# Patient Record
Sex: MALE | Race: WHITE | NOT HISPANIC OR LATINO | Employment: OTHER | URBAN - NONMETROPOLITAN AREA
[De-identification: names, ages, dates, MRNs, and addresses within clinical notes are randomized per-mention and may not be internally consistent; named-entity substitution may affect disease eponyms.]

---

## 2017-08-22 ENCOUNTER — OFFICE VISIT (OUTPATIENT)
Dept: ORTHOPEDIC SURGERY | Facility: CLINIC | Age: 62
End: 2017-08-22

## 2017-08-22 DIAGNOSIS — M25.461 KNEE EFFUSION, RIGHT: ICD-10-CM

## 2017-08-22 DIAGNOSIS — M25.561 PAIN IN BOTH KNEES, UNSPECIFIED CHRONICITY: Primary | ICD-10-CM

## 2017-08-22 DIAGNOSIS — Z96.651 STATUS POST TOTAL KNEE REPLACEMENT, RIGHT: ICD-10-CM

## 2017-08-22 DIAGNOSIS — M25.562 PAIN IN BOTH KNEES, UNSPECIFIED CHRONICITY: Primary | ICD-10-CM

## 2017-08-22 DIAGNOSIS — M25.462 EFFUSION, LEFT KNEE: ICD-10-CM

## 2017-08-22 DIAGNOSIS — M17.32 POST-TRAUMATIC OSTEOARTHRITIS OF LEFT KNEE: ICD-10-CM

## 2017-08-22 PROCEDURE — 20610 DRAIN/INJ JOINT/BURSA W/O US: CPT | Performed by: ORTHOPAEDIC SURGERY

## 2017-08-22 PROCEDURE — 73560 X-RAY EXAM OF KNEE 1 OR 2: CPT | Performed by: ORTHOPAEDIC SURGERY

## 2017-08-22 PROCEDURE — 99213 OFFICE O/P EST LOW 20 MIN: CPT | Performed by: ORTHOPAEDIC SURGERY

## 2017-08-22 RX ADMIN — LIDOCAINE HYDROCHLORIDE 2 ML: 10 INJECTION, SOLUTION INFILTRATION; PERINEURAL at 11:13

## 2017-08-22 RX ADMIN — METHYLPREDNISOLONE ACETATE 160 MG: 80 INJECTION, SUSPENSION INTRA-ARTICULAR; INTRALESIONAL; INTRAMUSCULAR; SOFT TISSUE at 11:13

## 2017-08-31 PROBLEM — M25.461 KNEE EFFUSION, RIGHT: Status: ACTIVE | Noted: 2017-08-31

## 2017-08-31 PROBLEM — M25.562 PAIN IN BOTH KNEES: Status: ACTIVE | Noted: 2017-08-31

## 2017-08-31 PROBLEM — M25.561 PAIN IN BOTH KNEES: Status: ACTIVE | Noted: 2017-08-31

## 2017-08-31 PROBLEM — Z96.651 STATUS POST TOTAL KNEE REPLACEMENT, RIGHT: Status: ACTIVE | Noted: 2017-08-31

## 2017-08-31 PROBLEM — M25.462 EFFUSION, LEFT KNEE: Status: ACTIVE | Noted: 2017-08-31

## 2017-08-31 RX ORDER — LIDOCAINE HYDROCHLORIDE 10 MG/ML
2 INJECTION, SOLUTION INFILTRATION; PERINEURAL
Status: COMPLETED | OUTPATIENT
Start: 2017-08-22 | End: 2017-08-22

## 2017-08-31 RX ORDER — METHYLPREDNISOLONE ACETATE 80 MG/ML
160 INJECTION, SUSPENSION INTRA-ARTICULAR; INTRALESIONAL; INTRAMUSCULAR; SOFT TISSUE
Status: COMPLETED | OUTPATIENT
Start: 2017-08-22 | End: 2017-08-22

## 2018-11-05 ENCOUNTER — OFFICE VISIT CONVERTED (OUTPATIENT)
Dept: CARDIOLOGY | Facility: CLINIC | Age: 63
End: 2018-11-05
Attending: SPECIALIST

## 2018-11-05 ENCOUNTER — CONVERSION ENCOUNTER (OUTPATIENT)
Dept: OTHER | Facility: HOSPITAL | Age: 63
End: 2018-11-05

## 2018-11-06 ENCOUNTER — OFFICE VISIT (OUTPATIENT)
Dept: ORTHOPEDIC SURGERY | Facility: CLINIC | Age: 63
End: 2018-11-06

## 2018-11-06 DIAGNOSIS — Z96.651 STATUS POST TOTAL KNEE REPLACEMENT, RIGHT: ICD-10-CM

## 2018-11-06 DIAGNOSIS — M25.561 PAIN IN BOTH KNEES, UNSPECIFIED CHRONICITY: Primary | ICD-10-CM

## 2018-11-06 DIAGNOSIS — M25.562 PAIN IN BOTH KNEES, UNSPECIFIED CHRONICITY: Primary | ICD-10-CM

## 2018-11-06 PROCEDURE — 73562 X-RAY EXAM OF KNEE 3: CPT | Performed by: ORTHOPAEDIC SURGERY

## 2018-11-06 PROCEDURE — 99213 OFFICE O/P EST LOW 20 MIN: CPT | Performed by: ORTHOPAEDIC SURGERY

## 2018-11-06 RX ORDER — CHLORAL HYDRATE 500 MG
1000 CAPSULE ORAL
COMMUNITY

## 2018-11-06 RX ORDER — RANITIDINE 150 MG/1
150 CAPSULE ORAL
COMMUNITY
End: 2022-10-04

## 2018-11-06 RX ORDER — AMLODIPINE BESYLATE AND BENAZEPRIL HYDROCHLORIDE 10; 40 MG/1; MG/1
1 CAPSULE ORAL DAILY
COMMUNITY
Start: 2018-11-02

## 2018-11-06 NOTE — PROGRESS NOTES
Chief Complaint   Patient presents with   • Right Knee - Follow-up   • Left Knee - Follow-up           HPI The patient is here today for a follow up of his right and left knee.  The patient had a right total knee arthroplasty performed by Dr. Silvestre about 10 years ago.  He states that he is always had troubles with that knee replacement.  It has never really functioned well for him.  The patient states that he wants a revision on that knee.  Patient states that knee edith and gives out from underneath him.  It is not stable for him.  He states that he really would like to get it revised.  His left knee has also started to bother him quite a bit.  He has pain and discomfort on the medial aspect of the knee.  He has progressive varus deformity of that knee.  He states that he has been having in her ear problems and is consulting with an ENT surgeon on that condition.  The patient also states that he is recently had a cardiac catheterization and would like to solve all his medical issues prior to committing for surgical intervention on knee replacement.  The patient had bilateral shoulder pain and discomfort with weakness in abduction.  Cross body activities bother him significantly.  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         There were no vitals filed for this visit.        Review of Systems   Constitutional: Negative.    HENT: Negative.    Eyes: Negative.    Respiratory: Negative.    Cardiovascular: Negative.    Gastrointestinal: Negative.    Endocrine: Negative.    Genitourinary: Negative.    Musculoskeletal: Positive for gait problem and joint swelling.   Skin: Negative.    Allergic/Immunologic: Negative.    Hematological: Negative.    Psychiatric/Behavioral: Negative.            Physical Exam   Constitutional: He appears  well-nourished.   HENT:   Head: Atraumatic.   Eyes: EOM are normal.   Neck: Neck supple.   Cardiovascular: Normal heart sounds and intact distal pulses.   Pulmonary/Chest: Breath sounds normal.   Abdominal: Bowel sounds are normal.   Musculoskeletal: He exhibits edema and tenderness.   Neurological: He is alert.   Skin: Skin is warm. Capillary refill takes 2 to 3 seconds.   Psychiatric: He has a normal mood and affect. His behavior is normal. Thought content normal.   Nursing note and vitals reviewed.          Joint/Body Part Specific Exam:  right knee.The patient is status post total knee arthroplasty postoperative 10 year(s). Incision is clean. Calf is soft and nontender. Homans sign is negative. There is no clicking, popping or catching. Anterior and posterior drawer signs are negative.  There is no instability of the components. Appropriate amounts of swelling and bruising are noted. Dorsalis pedis and posterior tibial artery pulses are palpable. Common peroneal nerve function is well preserved. Range of motion is from 0- 110 degrees of flexion. Gait is cautious but otherwise fairly normal. There is no evidence of a deep seated joint infection.    left knee. Patient has crepitus throughout range of motion. Positive patellar grind test. Mild effusion. Lachman is negative. Pivot shift is negative. Anterior and posterior drawer signs are negative. Significant joint line tenderness is noted on the medial aspect of the knee. Patient has a varus orientation of the knee. There is fullness and tenderness in the Popliteal fossa. Mild distention of a Popliteal cyst is noted in this location. Range of motion in flexion is from 0- 110 degrees. Neurovascular status is intact.  Dorsalis pedis and posterior tibial artery pulses are palpable. Common peroneal nerve function is well preserved. Patient's gait is cautious and antalgic. Skin and soft tissues are mildly swollen, consistent with synovitis and effusion. The patient has  a significant limp with the first few steps after starting the gait cycle. Getting out of a chair takes a lot of effort due to pain on knee flexion.  X-RAY Report:  bilateral Knee X-Ray  Indication: Evaluation of pain in the left knee and evaluation of implants on the right side  AP, Lateral views  Findings: advanced djd medial compartment of left knee  no bony lesion  Soft tissues within normal limits  decreased joint spaces on the left knee  Hardware appropriately positioned yes; on the right side      yes prior studies available for comparison.    X-RAY was ordered and reviewed by Cholo Mckeon MD      Diagnostics:        Quentin was seen today for follow-up and follow-up.    Diagnoses and all orders for this visit:    Pain in both knees, unspecified chronicity  -     XR Knee 3 View Bilateral    Status post total knee replacement, right            Procedures        Plan: The patient states that both his knees are bothering him quite a bit.  He is wanting me to go ahead with a revision on the right knee replacement which has always bothered him quite a bit.    He also needs a left total knee arthroplasty and I have discussed that with the patient at length.    At this point he is having some issues with his inner year function and states that he is going to work with an ENT surgeon to help improve his issues with balance.    He is also had a recent cardiac catheterization and would like to get healthier than he is at this point to 38 he would can templated surgical intervention.    Use a cane for assistance with ambulation.    Tablet ibuprofen 600 mg orally twice a day for pain swelling and discomfort.    Follow-up in my office in 3 months for further decision making.

## 2018-11-19 ENCOUNTER — OFFICE VISIT CONVERTED (OUTPATIENT)
Dept: CARDIOLOGY | Facility: CLINIC | Age: 63
End: 2018-11-19
Attending: SPECIALIST

## 2018-11-19 ENCOUNTER — CONVERSION ENCOUNTER (OUTPATIENT)
Dept: OTHER | Facility: HOSPITAL | Age: 63
End: 2018-11-19

## 2020-01-30 ENCOUNTER — OFFICE VISIT CONVERTED (OUTPATIENT)
Dept: CARDIOLOGY | Facility: CLINIC | Age: 65
End: 2020-01-30
Attending: SPECIALIST

## 2020-02-03 ENCOUNTER — CONVERSION ENCOUNTER (OUTPATIENT)
Dept: CARDIOLOGY | Facility: CLINIC | Age: 65
End: 2020-02-03
Attending: SPECIALIST

## 2020-02-20 ENCOUNTER — OFFICE VISIT CONVERTED (OUTPATIENT)
Dept: CARDIOLOGY | Facility: CLINIC | Age: 65
End: 2020-02-20
Attending: SPECIALIST

## 2020-02-20 ENCOUNTER — CONVERSION ENCOUNTER (OUTPATIENT)
Dept: OTHER | Facility: HOSPITAL | Age: 65
End: 2020-02-20

## 2020-08-26 ENCOUNTER — OFFICE VISIT CONVERTED (OUTPATIENT)
Dept: CARDIOLOGY | Facility: CLINIC | Age: 65
End: 2020-08-26
Attending: INTERNAL MEDICINE

## 2021-01-06 ENCOUNTER — HOSPITAL ENCOUNTER (OUTPATIENT)
Dept: CARDIOLOGY | Facility: HOSPITAL | Age: 66
Discharge: HOME | End: 2021-01-06
Admitting: STUDENT IN AN ORGANIZED HEALTH CARE EDUCATION/TRAINING PROGRAM

## 2021-01-06 ENCOUNTER — TRANSCRIBE ORDERS (OUTPATIENT)
Dept: CARDIOLOGY | Facility: HOSPITAL | Age: 66
End: 2021-01-06

## 2021-01-06 DIAGNOSIS — I10 ESSENTIAL HYPERTENSION, MALIGNANT: Primary | ICD-10-CM

## 2021-01-06 LAB — QT INTERVAL: 438 MS

## 2021-01-06 PROCEDURE — 93010 ELECTROCARDIOGRAM REPORT: CPT | Performed by: INTERNAL MEDICINE

## 2021-01-06 PROCEDURE — 93005 ELECTROCARDIOGRAM TRACING: CPT

## 2021-01-08 ENCOUNTER — LAB REQUISITION (OUTPATIENT)
Dept: LAB | Facility: HOSPITAL | Age: 66
End: 2021-01-08

## 2021-01-08 DIAGNOSIS — Z00.00 ENCOUNTER FOR GENERAL ADULT MEDICAL EXAMINATION WITHOUT ABNORMAL FINDINGS: ICD-10-CM

## 2021-01-09 PROCEDURE — U0004 COV-19 TEST NON-CDC HGH THRU: HCPCS | Performed by: STUDENT IN AN ORGANIZED HEALTH CARE EDUCATION/TRAINING PROGRAM

## 2021-01-11 LAB — SARS-COV-2 RNA RESP QL NAA+PROBE: NOT DETECTED

## 2021-03-02 ENCOUNTER — CONVERSION ENCOUNTER (OUTPATIENT)
Dept: CARDIOLOGY | Facility: CLINIC | Age: 66
End: 2021-03-02

## 2021-03-02 ENCOUNTER — OFFICE VISIT CONVERTED (OUTPATIENT)
Dept: CARDIOLOGY | Facility: CLINIC | Age: 66
End: 2021-03-02
Attending: INTERNAL MEDICINE

## 2021-03-11 ENCOUNTER — HOSPITAL ENCOUNTER (OUTPATIENT)
Dept: NUCLEAR MEDICINE | Facility: HOSPITAL | Age: 66
Discharge: HOME | End: 2021-03-11
Attending: INTERNAL MEDICINE

## 2021-05-13 NOTE — PROGRESS NOTES
Progress Note      Patient Name: Quentin Hart   Patient ID: [id number]   Sex: Male   YOB: 1955    Primary Care Provider: Janet BRICEÑO   Referring Provider: SOUMYA LIANG MD    Visit Date: August 26, 2020    Provider: Jhonny Vega MD   Location: [facility]   Location Address: [address] [place] [address] [facility]  [address] [place] [address]  [id number]   Location Phone: [phone]          Chief Complaint     Followup visit for frequent PVCs and shortness of breath.       History Of Present Illness  REFERRING CARE PROVIDER: Janet BRICEÑO   Quentin Hart is a 64 year old /White male with a history of frequent PVCs and minimal coronary artery disease who is here for a followup visit. The patient generally follows up with Dr. Benz, but wants to switch to the [facility] to be closer to his home. He denies having any chest pain, tightness, or pressure at this time. He does not feel any skipped beats or any palpitations. He has shortness of breath on moderate exertion, which is at his baseline. He ran out of his metoprolol one month back, but still does not feel any palpitations. No dizziness. No syncopal episodes. Since his last visit, patient underwent a sleep study and was diagnosed with obstructive sleep apnea. He is using CPAP on a regular basis at this time.   PAST MEDICAL HISTORY: 1) Diabetes mellitus; 2) Hypertension; 3) Obstructive sleep apnea.   PSYCHOSOCIAL HISTORY: Previously smoked, but quit in 1973. Denies alcohol use. Admits mood changes and depression.   CURRENT MEDICATIONS: Amlodipine-benazepril 10-40 mg q. h.s.; fish oil 1000 mg 2 daily; metformin 500 mg daily; metoprolol succinate ER 25 mg daily; famotidine 20 mg 2 daily; vitamin D3 5000 units daily; magnesium; potassium; PreserVision.       Review of Systems  · Cardiovascular  o Admits  o : palpitations (fast, fluttering, or skipping beats), shortness of breath while walking or lying  flat  o Denies  o : swelling (feet, ankles, hands), chest pain or angina pectoris   · Respiratory  o Denies  o : chronic or frequent cough, asthma or wheezing      Vitals  Date Time BP Position Site L\R Cuff Size HR RR TEMP (F) WT  HT  BMI kg/m2 BSA m2 O2 Sat HC       08/26/2020 09:55 /90 Sitting    58 - R   255lbs 0oz 6'   34.58 2.42     08/26/2020 09:55 /88 Sitting                     Physical Examination  · Respiratory  o Auscultation of Lungs  o : Clear to auscultation bilaterally. No crackles or rhonchi.  · Cardiovascular  o Heart  o : S1, S2 is normally heard. No S3. No murmur, rubs, or gallops.  · Gastrointestinal  o Abdominal Examination  o : Soft, nontender, nondistended. No free fluid. Bowel sounds heard in all four quadrants.  · Extremities  o Extremities  o : Warm and well perfused. No pitting pedal edema. Distal pulses present.          Assessment     ASSESSMENT & PLAN:    1.  Frequent PVCs.  Preserved LV function.  Currently no symptoms.  He is out of metoprolol for almost a        month.  At this time, we will restart metoprolol at half the dose of 25 mg half-tablet daily.    2.  Shortness of breath, likely multifactorial.  Cardiac workup unremarkable.  Cardiac workup, including cardiac        catheterization, unremarkable.  3.  Hypertension, reasonably well controlled.  Continue current regimen.  4.  Follow up in 6 months or earlier if needed.        MD DEMETRICE Duran:vm             Electronically Signed by: Jhonny Vega MD -Author on August 28, 2020 10:14:07 AM

## 2021-05-14 VITALS
WEIGHT: 255 LBS | DIASTOLIC BLOOD PRESSURE: 90 MMHG | HEIGHT: 72 IN | SYSTOLIC BLOOD PRESSURE: 145 MMHG | BODY MASS INDEX: 34.54 KG/M2 | HEART RATE: 58 BPM

## 2021-05-14 VITALS
HEIGHT: 72 IN | WEIGHT: 251 LBS | SYSTOLIC BLOOD PRESSURE: 152 MMHG | BODY MASS INDEX: 34 KG/M2 | DIASTOLIC BLOOD PRESSURE: 72 MMHG | HEART RATE: 62 BPM

## 2021-05-14 NOTE — PROGRESS NOTES
Progress Note      Patient Name: Quentin Hart   Patient ID: [id number]   Sex: Male   YOB: 1955    Primary Care Provider: Janet BRICEÑO   Referring Provider: SOUMYA LIANG MD    Visit Date: March 2, 2021    Provider: Jhonny Vega MD   Location: [facility]   Location Address: [address] [place], [place] [address]  [address]  [place], [place]  [id number]   Location Phone: [phone]          Chief Complaint  · Frequent PVCs, patient reports chest pain       History Of Present Illness  REFERRING CARE PROVIDER: Janet BRICEÑO   Quentin Hart is a 65-year-old male with palpitations, frequent PVCs, minimal coronary artery disease who is here for a followup visit. Today, patient reports having episodes of chest pain described as tightness and heaviness in the central part of the chest both at rest and also related to activities. Palpitations are better. No dizziness or syncopal episodes. He still has shortness of breath on moderate exertion.   PAST MEDICAL HISTORY: (1) Diabetes mellitus. (2) Hypertension. (3) Obstructive sleep apnea. (4) Frequent PVCs. Holter monitor study on 02/03/2020 showed PVC burden of 13.1%.   PSYCHOSOCIAL HISTORY: Denies alcohol use. Previously smoked but quit.   CURRENT MEDICATIONS: Medication list was reviewed and is as documented.      ALLERGIES: Morphine.       Review of Systems  · Cardiovascular  o Admits  o : shortness of breath while walking or lying flat, chest pain or angina pectoris   o Denies  o : palpitations (fast, fluttering, or skipping beats), swelling (feet, ankles, hands)  · Respiratory  o Denies  o : chronic or frequent cough      Vitals  Date Time BP Position Site L\R Cuff Size HR RR TEMP (F) WT  HT  BMI kg/m2 BSA m2 O2 Sat FR L/min FiO2 HC       03/02/2021 03:09 /72 Sitting    62 - R   250lbs 16oz 6'   34.04 2.4       03/02/2021 03:09 /68 Sitting    62 - R                   Physical Examination  · Respiratory  o Auscultation of  Lungs  o : Clear to auscultation bilaterally. No crackles or rhonchi.  · Cardiovascular  o Heart  o : S1, S2 is normally heard. No S3. No murmur, rubs, or gallops.  · Gastrointestinal  o Abdominal Examination  o : Soft, nontender, nondistended. No free fluid. Bowel sounds heard in all four quadrants.  · Extremities  o Extremities  o : Warm and well perfused. No pitting pedal edema. Distal pulses present.  · Labs  o Labs  o : From 01/20/2021, total cholesterol 201, HDL 42, triglyceride 135, , BUN 14, creatinine 0.97, sodium 141, potassium 4.6, AST 17, AST 31, hemoglobin 15.0, hemoglobin A1c 6.9, TSH 1.75.          Assessment     ASSESSMENT AND PLAN:  1.  Chest pain:  Symptoms are new onset and concerning for angina. Risk factors of coronary artery disease        include diabetes and hypertension. Previous echocardiogram showed  preserved LV function in 2020. No        recent ischemic evaluation available. Will proceed with a SPECT stress test to rule out reversible ischemia.        Recommend to continue aspirin and beta-blockers.  2.  Frequent PVCs. Currently no major palpitations.  Continue Metoprolol at the current dose.   3.  Hypertension, well controlled.  4.  Followup with SPECT report.       Jhonny Vega MD  JV/pap             Electronically Signed by: Ruth Rausch-, Other -Author on March 9, 2021 02:52:42 PM  Electronically Co-signed by: Jhonny Vega MD -Reviewer on March 9, 2021 03:56:50 PM

## 2021-05-15 VITALS
DIASTOLIC BLOOD PRESSURE: 78 MMHG | HEART RATE: 71 BPM | SYSTOLIC BLOOD PRESSURE: 128 MMHG | WEIGHT: 251.12 LBS | HEIGHT: 72 IN | BODY MASS INDEX: 34.01 KG/M2

## 2021-05-15 VITALS
HEIGHT: 72 IN | DIASTOLIC BLOOD PRESSURE: 67 MMHG | WEIGHT: 250.12 LBS | SYSTOLIC BLOOD PRESSURE: 103 MMHG | HEART RATE: 70 BPM | BODY MASS INDEX: 33.88 KG/M2

## 2021-05-16 VITALS
BODY MASS INDEX: 32.64 KG/M2 | DIASTOLIC BLOOD PRESSURE: 83 MMHG | HEART RATE: 62 BPM | SYSTOLIC BLOOD PRESSURE: 124 MMHG | HEIGHT: 72 IN | WEIGHT: 241 LBS

## 2021-05-16 VITALS
HEIGHT: 72 IN | SYSTOLIC BLOOD PRESSURE: 134 MMHG | DIASTOLIC BLOOD PRESSURE: 81 MMHG | HEART RATE: 73 BPM | BODY MASS INDEX: 33.59 KG/M2 | WEIGHT: 248 LBS

## 2021-08-16 ENCOUNTER — APPOINTMENT (OUTPATIENT)
Dept: GENERAL RADIOLOGY | Facility: HOSPITAL | Age: 66
End: 2021-08-16

## 2021-08-16 ENCOUNTER — HOSPITAL ENCOUNTER (EMERGENCY)
Facility: HOSPITAL | Age: 66
Discharge: HOME | End: 2021-08-16
Attending: EMERGENCY MEDICINE | Admitting: EMERGENCY MEDICINE

## 2021-08-16 VITALS
HEIGHT: 72 IN | HEART RATE: 74 BPM | RESPIRATION RATE: 22 BRPM | DIASTOLIC BLOOD PRESSURE: 77 MMHG | WEIGHT: 241.62 LBS | OXYGEN SATURATION: 96 % | TEMPERATURE: 99.2 F | SYSTOLIC BLOOD PRESSURE: 142 MMHG | BODY MASS INDEX: 32.73 KG/M2

## 2021-08-16 DIAGNOSIS — U07.1 COVID-19: Primary | ICD-10-CM

## 2021-08-16 LAB
ALBUMIN SERPL-MCNC: 4.1 G/DL (ref 3.5–5.2)
ALBUMIN/GLOB SERPL: 1.2 G/DL
ALP SERPL-CCNC: 73 U/L (ref 39–117)
ALT SERPL W P-5'-P-CCNC: 49 U/L (ref 1–41)
ANION GAP SERPL CALCULATED.3IONS-SCNC: 10.9 MMOL/L (ref 5–15)
ARTERIAL PATENCY WRIST A: POSITIVE
AST SERPL-CCNC: 21 U/L (ref 1–40)
BASE EXCESS BLDA CALC-SCNC: 3 MMOL/L (ref -2–2)
BASOPHILS # BLD AUTO: 0.03 10*3/MM3 (ref 0–0.2)
BASOPHILS NFR BLD AUTO: 0.4 % (ref 0–1.5)
BDY SITE: ABNORMAL
BILIRUB SERPL-MCNC: 0.5 MG/DL (ref 0–1.2)
BUN SERPL-MCNC: 19 MG/DL (ref 8–23)
BUN/CREAT SERPL: 19.2 (ref 7–25)
CALCIUM SPEC-SCNC: 9.3 MG/DL (ref 8.6–10.5)
CHLORIDE SERPL-SCNC: 94 MMOL/L (ref 98–107)
CK MB SERPL-CCNC: 1.15 NG/ML
CK SERPL-CCNC: 37 U/L (ref 20–200)
CO2 SERPL-SCNC: 27.1 MMOL/L (ref 22–29)
COHGB MFR BLD: 0.5 % (ref 0–1.5)
CREAT SERPL-MCNC: 0.99 MG/DL (ref 0.76–1.27)
D-LACTATE SERPL-SCNC: 1.8 MMOL/L (ref 0.5–2)
DEPRECATED RDW RBC AUTO: 37.2 FL (ref 37–54)
EOSINOPHIL # BLD AUTO: 0 10*3/MM3 (ref 0–0.4)
EOSINOPHIL NFR BLD AUTO: 0 % (ref 0.3–6.2)
ERYTHROCYTE [DISTWIDTH] IN BLOOD BY AUTOMATED COUNT: 12.7 % (ref 12.3–15.4)
FHHB: 5.9 % (ref 0–5)
GAS FLOW AIRWAY: 0 LPM
GFR SERPL CREATININE-BSD FRML MDRD: 75 ML/MIN/1.73
GLOBULIN UR ELPH-MCNC: 3.4 GM/DL
GLUCOSE SERPL-MCNC: 265 MG/DL (ref 65–99)
HCO3 BLDA-SCNC: 27.2 MMOL/L (ref 22–26)
HCT VFR BLD AUTO: 44.4 % (ref 37.5–51)
HGB BLD-MCNC: 15.4 G/DL (ref 13–17.7)
HGB BLDA-MCNC: 15.4 G/DL (ref 13.8–16.4)
HOLD SPECIMEN: NORMAL
IMM GRANULOCYTES # BLD AUTO: 0.11 10*3/MM3 (ref 0–0.05)
IMM GRANULOCYTES NFR BLD AUTO: 1.6 % (ref 0–0.5)
INHALED O2 CONCENTRATION: 21 %
LIPASE SERPL-CCNC: 25 U/L (ref 13–60)
LYMPHOCYTES # BLD AUTO: 1 10*3/MM3 (ref 0.7–3.1)
LYMPHOCYTES NFR BLD AUTO: 14.6 % (ref 19.6–45.3)
MAGNESIUM SERPL-MCNC: 1.9 MG/DL (ref 1.6–2.4)
MCH RBC QN AUTO: 28.1 PG (ref 26.6–33)
MCHC RBC AUTO-ENTMCNC: 34.7 G/DL (ref 31.5–35.7)
MCV RBC AUTO: 81 FL (ref 79–97)
METHGB BLD QL: 0.2 % (ref 0–1.5)
MODALITY: ABNORMAL
MONOCYTES # BLD AUTO: 0.75 10*3/MM3 (ref 0.1–0.9)
MONOCYTES NFR BLD AUTO: 10.9 % (ref 5–12)
NEUTROPHILS NFR BLD AUTO: 4.96 10*3/MM3 (ref 1.7–7)
NEUTROPHILS NFR BLD AUTO: 72.5 % (ref 42.7–76)
NRBC BLD AUTO-RTO: 0 /100 WBC (ref 0–0.2)
NT-PROBNP SERPL-MCNC: 90.2 PG/ML (ref 0–900)
OXYHGB MFR BLDV: 93.4 % (ref 94–99)
PCO2 BLDA: 40.3 MM HG (ref 35–45)
PH BLDA: 7.45 PH UNITS (ref 7.35–7.45)
PLATELET # BLD AUTO: 180 10*3/MM3 (ref 140–450)
PMV BLD AUTO: 10.3 FL (ref 6–12)
PO2 BLD: 340 MM[HG] (ref 0–500)
PO2 BLDA: 71.4 MM HG (ref 80–100)
POTASSIUM SERPL-SCNC: 4.2 MMOL/L (ref 3.5–5.2)
PROT SERPL-MCNC: 7.5 G/DL (ref 6–8.5)
RBC # BLD AUTO: 5.48 10*6/MM3 (ref 4.14–5.8)
SAO2 % BLDCOA: 94.1 % (ref 95–99)
SARS-COV-2 N GENE RESP QL NAA+PROBE: DETECTED
SODIUM SERPL-SCNC: 132 MMOL/L (ref 136–145)
TROPONIN I SERPL-MCNC: 0 NG/ML (ref 0–0.6)
TROPONIN I SERPL-MCNC: 0.01 NG/ML (ref 0–0.6)
WBC # BLD AUTO: 6.85 10*3/MM3 (ref 3.4–10.8)
WHOLE BLOOD HOLD SPECIMEN: NORMAL

## 2021-08-16 PROCEDURE — 84484 ASSAY OF TROPONIN QUANT: CPT

## 2021-08-16 PROCEDURE — 36415 COLL VENOUS BLD VENIPUNCTURE: CPT

## 2021-08-16 PROCEDURE — 83605 ASSAY OF LACTIC ACID: CPT | Performed by: EMERGENCY MEDICINE

## 2021-08-16 PROCEDURE — 82805 BLOOD GASES W/O2 SATURATION: CPT | Performed by: EMERGENCY MEDICINE

## 2021-08-16 PROCEDURE — 82550 ASSAY OF CK (CPK): CPT | Performed by: EMERGENCY MEDICINE

## 2021-08-16 PROCEDURE — 93005 ELECTROCARDIOGRAM TRACING: CPT | Performed by: EMERGENCY MEDICINE

## 2021-08-16 PROCEDURE — 83050 HGB METHEMOGLOBIN QUAN: CPT | Performed by: EMERGENCY MEDICINE

## 2021-08-16 PROCEDURE — 83880 ASSAY OF NATRIURETIC PEPTIDE: CPT | Performed by: EMERGENCY MEDICINE

## 2021-08-16 PROCEDURE — 36600 WITHDRAWAL OF ARTERIAL BLOOD: CPT | Performed by: EMERGENCY MEDICINE

## 2021-08-16 PROCEDURE — 99284 EMERGENCY DEPT VISIT MOD MDM: CPT

## 2021-08-16 PROCEDURE — 87040 BLOOD CULTURE FOR BACTERIA: CPT | Performed by: EMERGENCY MEDICINE

## 2021-08-16 PROCEDURE — 82553 CREATINE MB FRACTION: CPT | Performed by: EMERGENCY MEDICINE

## 2021-08-16 PROCEDURE — 83690 ASSAY OF LIPASE: CPT | Performed by: EMERGENCY MEDICINE

## 2021-08-16 PROCEDURE — 71045 X-RAY EXAM CHEST 1 VIEW: CPT | Performed by: RADIOLOGY

## 2021-08-16 PROCEDURE — 93010 ELECTROCARDIOGRAM REPORT: CPT | Performed by: INTERNAL MEDICINE

## 2021-08-16 PROCEDURE — 83735 ASSAY OF MAGNESIUM: CPT | Performed by: EMERGENCY MEDICINE

## 2021-08-16 PROCEDURE — 80053 COMPREHEN METABOLIC PANEL: CPT | Performed by: EMERGENCY MEDICINE

## 2021-08-16 PROCEDURE — 93005 ELECTROCARDIOGRAM TRACING: CPT

## 2021-08-16 PROCEDURE — 82375 ASSAY CARBOXYHB QUANT: CPT | Performed by: EMERGENCY MEDICINE

## 2021-08-16 PROCEDURE — 87635 SARS-COV-2 COVID-19 AMP PRB: CPT | Performed by: EMERGENCY MEDICINE

## 2021-08-16 PROCEDURE — 85025 COMPLETE CBC W/AUTO DIFF WBC: CPT

## 2021-08-16 PROCEDURE — 71045 X-RAY EXAM CHEST 1 VIEW: CPT

## 2021-08-16 RX ORDER — SODIUM CHLORIDE 0.9 % (FLUSH) 0.9 %
10 SYRINGE (ML) INJECTION AS NEEDED
Status: DISCONTINUED | OUTPATIENT
Start: 2021-08-16 | End: 2021-08-16 | Stop reason: HOSPADM

## 2021-08-16 RX ORDER — FAMOTIDINE 10 MG
10 TABLET ORAL 2 TIMES DAILY
COMMUNITY
End: 2022-10-04

## 2021-08-16 RX ORDER — ASPIRIN 81 MG/1
324 TABLET, CHEWABLE ORAL ONCE
Status: COMPLETED | OUTPATIENT
Start: 2021-08-16 | End: 2021-08-16

## 2021-08-16 RX ORDER — ACETAMINOPHEN 500 MG
1000 TABLET ORAL ONCE
Status: COMPLETED | OUTPATIENT
Start: 2021-08-16 | End: 2021-08-16

## 2021-08-16 RX ORDER — ACETAMINOPHEN 160 MG/5ML
1000 SOLUTION ORAL ONCE
Status: DISCONTINUED | OUTPATIENT
Start: 2021-08-16 | End: 2021-08-16

## 2021-08-16 RX ORDER — ROSUVASTATIN CALCIUM 5 MG/1
5 TABLET, COATED ORAL DAILY
COMMUNITY
End: 2022-10-04

## 2021-08-16 RX ADMIN — ACETAMINOPHEN 1000 MG: 500 TABLET ORAL at 11:47

## 2021-08-16 RX ADMIN — ASPIRIN 324 MG: 81 TABLET, CHEWABLE ORAL at 11:52

## 2021-08-16 NOTE — ED PROVIDER NOTES
Time: 11:02 AM EDT  Arrived by: private vehicle; accompanied by spouse   Chief Complaint: COUGH and SOB   History provided by: pt  History is limited by: N/A    History of Present Illness:  Patient is a 65 y.o. male that presents to the emergency department with COUGH and SOB. Symptoms started 8 days ago and are still present and constant. It is gradual in onset. Symptoms are described as moderate in severity. Nothing improves or worsens symptoms.     Pt also c/o CP due to coughing. He voices no other complaints at this time. No fever.     Pt has had his first COVID-19 vaccine on 7/29/21.     History provided by:  Patient    Similar Symptoms Previously: no  Recently seen: not recently seen in this ED     Patient Care Team  Primary Care Provider: Janet Cook APRN     Past Medical History:     Allergies   Allergen Reactions   • Morphine Nausea And Vomiting     Past Medical History:   Diagnosis Date   • Diabetes mellitus (CMS/HCC)    • Hypertension      History reviewed. No pertinent surgical history.  History reviewed. No pertinent family history.    Home Medications:  Prior to Admission medications    Medication Sig Start Date End Date Taking? Authorizing Provider   famotidine (PEPCID) 10 MG tablet Take 10 mg by mouth 2 (Two) Times a Day.   Yes Gloria Pierce MD   metFORMIN (GLUCOPHAGE) 500 MG tablet Take 500 mg by mouth 2 (Two) Times a Day With Meals.   Yes Gloria Pierce MD   rosuvastatin (CRESTOR) 5 MG tablet Take 5 mg by mouth Daily.   Yes Gloria Pierce MD   amLODIPine-benazepril (LOTREL) 10-40 MG per capsule  11/2/18   Gloria Pierce MD   Cholecalciferol (VITAMIN D) 1000 units tablet Take 5,000 Units by mouth.    Gloria Pierce MD   Magnesium Carbonate 54 MG/5ML liquid Take 400 mg by mouth.    Gloria Pierce MD   Omega-3 Fatty Acids (FISH OIL) 1000 MG capsule capsule Take 1,200 each by mouth.    Gloria Pierce MD   ranitidine (ZANTAC) 150 MG capsule Take 150  "mg by mouth.    Provider, Historical, MD        Social History:   PT  reports that he has quit smoking. He does not have any smokeless tobacco history on file. He reports previous alcohol use. He reports that he does not use drugs.    Record Review:  I have reviewed the patient's records in [facility].     Review of Systems  Review of Systems   Constitutional: Negative for chills, diaphoresis and fever.   HENT: Negative for ear discharge and nosebleeds.    Eyes: Negative for photophobia.   Respiratory: Positive for cough and shortness of breath.    Cardiovascular: Positive for chest pain (secondary to cough).   Gastrointestinal: Negative for diarrhea, nausea and vomiting.   Genitourinary: Negative for dysuria.   Musculoskeletal: Negative for back pain and neck pain.   Skin: Negative for rash.   Neurological: Negative for headaches.   All other systems reviewed and are negative.       Physical Exam  /77   Pulse 74   Temp 99.2 °F (37.3 °C)   Resp 22   Ht 182.9 cm (72\")   Wt 110 kg (241 lb 10 oz)   SpO2 96%   BMI 32.77 kg/m²     Physical Exam  Vitals and nursing note reviewed.   Constitutional:       General: He is not in acute distress.     Appearance: Normal appearance.   HENT:      Head: Normocephalic and atraumatic.      Nose: Nose normal.   Eyes:      General: No scleral icterus.  Cardiovascular:      Rate and Rhythm: Normal rate and regular rhythm.      Heart sounds: Normal heart sounds.   Pulmonary:      Effort: Pulmonary effort is normal. No respiratory distress.      Breath sounds: Examination of the right-lower field reveals rhonchi. Examination of the left-lower field reveals rhonchi. Rhonchi present.      Comments: Actively coughing.   Abdominal:      Palpations: Abdomen is soft.      Tenderness: There is no abdominal tenderness.   Musculoskeletal:         General: Normal range of motion.      Cervical back: Neck supple.      Right lower leg: No edema.      Left lower leg: No edema.   Skin:     " "General: Skin is warm and dry.   Neurological:      General: No focal deficit present.      Mental Status: He is alert and oriented to person, place, and time.      Sensory: No sensory deficit.      Motor: No weakness.                  ED Course  /77   Pulse 74   Temp 99.2 °F (37.3 °C)   Resp 22   Ht 182.9 cm (72\")   Wt 110 kg (241 lb 10 oz)   SpO2 96%   BMI 32.77 kg/m²   Results for orders placed or performed during the hospital encounter of 08/16/21   COVID-19, [facility] IN-HOUSE, NP SWAB IN TRANSPORT MEDIA 8-10 HR TAT - Swab, Nasopharynx    Specimen: Nasopharynx; Swab   Result Value Ref Range    COVID19 Detected (C) Not Detected - Ref. Range   Comprehensive Metabolic Panel    Specimen: Blood   Result Value Ref Range    Glucose 265 (H) 65 - 99 mg/dL    BUN 19 8 - 23 mg/dL    Creatinine 0.99 0.76 - 1.27 mg/dL    Sodium 132 (L) 136 - 145 mmol/L    Potassium 4.2 3.5 - 5.2 mmol/L    Chloride 94 (L) 98 - 107 mmol/L    CO2 27.1 22.0 - 29.0 mmol/L    Calcium 9.3 8.6 - 10.5 mg/dL    Total Protein 7.5 6.0 - 8.5 g/dL    Albumin 4.10 3.50 - 5.20 g/dL    ALT (SGPT) 49 (H) 1 - 41 U/L    AST (SGOT) 21 1 - 40 U/L    Alkaline Phosphatase 73 39 - 117 U/L    Total Bilirubin 0.5 0.0 - 1.2 mg/dL    eGFR Non African Amer 75 >60 mL/min/1.73    Globulin 3.4 gm/dL    A/G Ratio 1.2 g/dL    BUN/Creatinine Ratio 19.2 7.0 - 25.0    Anion Gap 10.9 5.0 - 15.0 mmol/L   Lipase    Specimen: Blood   Result Value Ref Range    Lipase 25 13 - 60 U/L   BNP    Specimen: Blood   Result Value Ref Range    proBNP 90.2 0.0 - 900.0 pg/mL   Magnesium    Specimen: Blood   Result Value Ref Range    Magnesium 1.9 1.6 - 2.4 mg/dL   CK Total & CKMB    Specimen: Blood   Result Value Ref Range    CKMB 1.15 <=10.40 ng/mL    Creatine Kinase 37 20 - 200 U/L   CBC Auto Differential    Specimen: Blood   Result Value Ref Range    WBC 6.85 3.40 - 10.80 10*3/mm3    RBC 5.48 4.14 - 5.80 10*6/mm3    Hemoglobin 15.4 13.0 - 17.7 g/dL    Hematocrit 44.4 37.5 - " 51.0 %    MCV 81.0 79.0 - 97.0 fL    MCH 28.1 26.6 - 33.0 pg    MCHC 34.7 31.5 - 35.7 g/dL    RDW 12.7 12.3 - 15.4 %    RDW-SD 37.2 37.0 - 54.0 fl    MPV 10.3 6.0 - 12.0 fL    Platelets 180 140 - 450 10*3/mm3    Neutrophil % 72.5 42.7 - 76.0 %    Lymphocyte % 14.6 (L) 19.6 - 45.3 %    Monocyte % 10.9 5.0 - 12.0 %    Eosinophil % 0.0 (L) 0.3 - 6.2 %    Basophil % 0.4 0.0 - 1.5 %    Immature Grans % 1.6 (H) 0.0 - 0.5 %    Neutrophils, Absolute 4.96 1.70 - 7.00 10*3/mm3    Lymphocytes, Absolute 1.00 0.70 - 3.10 10*3/mm3    Monocytes, Absolute 0.75 0.10 - 0.90 10*3/mm3    Eosinophils, Absolute 0.00 0.00 - 0.40 10*3/mm3    Basophils, Absolute 0.03 0.00 - 0.20 10*3/mm3    Immature Grans, Absolute 0.11 (H) 0.00 - 0.05 10*3/mm3    nRBC 0.0 0.0 - 0.2 /100 WBC   Lactic Acid, Plasma    Specimen: Blood   Result Value Ref Range    Lactate 1.8 0.5 - 2.0 mmol/L   Blood Gas, Arterial -With Co-Ox Panel: Yes    Specimen: Arterial Blood   Result Value Ref Range    pH, Arterial 7.447 7.350 - 7.450 pH units    pCO2, Arterial 40.3 35.0 - 45.0 mm Hg    pO2, Arterial 71.4 (L) 80.0 - 100.0 mm Hg    HCO3, Arterial 27.2 (H) 22.0 - 26.0 mmol/L    Base Excess, Arterial 3.0 (H) -2.0 - 2.0 mmol/L    O2 Saturation, Arterial 94.1 (L) 95.0 - 99.0 %    Hemoglobin, Blood Gas 15.4 13.8 - 16.4 g/dL    Carboxyhemoglobin 0.5 0 - 1.5 %    Methemoglobin 0.20 0.00 - 1.50 %    Oxyhemoglobin 93.4 (L) 94 - 99 %    FHHB 5.9 (H) 0.0 - 5.0 %    Site Arterial: left radial     Modality Room Air     FIO2 21 %    Flow Rate 0 lpm    PO2/FIO2 340 0 - 500    Myron's Test Positive    POC Troponin I    Specimen: Blood   Result Value Ref Range    Troponin I 0.01 0.00 - 0.60 ng/mL   POC Troponin I    Specimen: Blood   Result Value Ref Range    Troponin I 0.00 0.00 - 0.60 ng/mL   ECG 12 Lead   Result Value Ref Range    QT Interval 365 ms   ECG 12 Lead   Result Value Ref Range    QT Interval 375 ms   Green Top (Gel)   Result Value Ref Range    Extra Tube Hold for add-ons.     Lavender Top   Result Value Ref Range    Extra Tube hold for add-on    Gold Top - SST   Result Value Ref Range    Extra Tube Hold for add-ons.    HOLD Troponin-I Tube   Result Value Ref Range    Extra Tube Hold for add-ons.    HOLD Troponin-I Tube   Result Value Ref Range    Extra Tube Hold for add-ons.      Medications   aspirin chewable tablet 324 mg (324 mg Oral Given 8/16/21 1152)   acetaminophen (TYLENOL) tablet 1,000 mg (1,000 mg Oral Given 8/16/21 1147)     XR Chest 1 View    Result Date: 8/16/2021  Narrative: PROCEDURE: XR CHEST 1 VW  COMPARISON: None  INDICATIONS: Chest Pain triage protocol  FINDINGS:  The lungs are clear bilaterally.  The cardiac and mediastinal silhouettes appear normal.  No effusion is seen.  CONCLUSION:  1. No acute cardiopulmonary disease.       Jose Estrada M.D.       Electronically Signed and Approved By: Jose Estrada M.D. on 8/16/2021 at 12:08               Procedures/EKGs:  Procedures    Medical Decision Making:                     MDM  Number of Diagnoses or Management Options  COVID-19  Diagnosis management comments: Patient presents complaining of cough with associated chest discomfort.  Differential concerns include pneumonia versus bronchitis versus Covid.  Covid test is positive which resulted after the patient was discharged in the ED.  Blood gas showed a PO2 of 71 and the patient is appropriate for outpatient therapy chest radiograph did not show acute infiltrate.  Serum lactate is normal making sepsis unlikely.  Chemistries show glucose 265 sodium 132.  BNP is 90 making CHF unlikely.  Patient remained stable during his ED stay was discharged stable with supportive care recommended and patient is to return for dyspnea other concerns.  EKG was unremarkable did not show findings to suggest ACS as a source of her symptoms.       Amount and/or Complexity of Data Reviewed  Clinical lab tests: reviewed  Tests in the radiology section of CPT®: reviewed  Tests in the medicine  section of CPT®: reviewed    Risk of Complications, Morbidity, and/or Mortality  Presenting problems: high         Final diagnoses:   COVID-19        Disposition:  ED Disposition     ED Disposition Condition Comment    Discharge Stable           Documentation assistance provided by Earline Payton acting as scribe for Navin Thibodeaux MD. Information recorded by the scribe was done at my direction and has been verified and validated by me.       Earline Payton  08/16/21 1108       Navin Thibodeaux MD  08/16/21 2023       Navin Thibodeaux MD  08/16/21 2051

## 2021-08-16 NOTE — ED NOTES
PT STATES THAT HE HAS HAD A COUGH SINCE LAST WEEK, STATES HE HAS CHEST PAIN FROM COUGHING SO MUCH. PT STATES THAT HE IS VACCINATED.     Anne Alanis RN  08/16/21 [id number]

## 2021-08-17 ENCOUNTER — TELEPHONE (OUTPATIENT)
Dept: EMERGENCY DEPT | Facility: HOSPITAL | Age: 66
End: 2021-08-17

## 2021-08-18 ENCOUNTER — APPOINTMENT (OUTPATIENT)
Dept: GENERAL RADIOLOGY | Facility: HOSPITAL | Age: 66
End: 2021-08-18

## 2021-08-18 ENCOUNTER — HOSPITAL ENCOUNTER (EMERGENCY)
Facility: HOSPITAL | Age: 66
Discharge: HOME | End: 2021-08-18
Attending: EMERGENCY MEDICINE | Admitting: EMERGENCY MEDICINE

## 2021-08-18 VITALS
TEMPERATURE: 99.7 F | HEIGHT: 72 IN | BODY MASS INDEX: 32.23 KG/M2 | OXYGEN SATURATION: 91 % | WEIGHT: 238 LBS | HEART RATE: 77 BPM | RESPIRATION RATE: 24 BRPM | DIASTOLIC BLOOD PRESSURE: 76 MMHG | SYSTOLIC BLOOD PRESSURE: 115 MMHG

## 2021-08-18 DIAGNOSIS — U07.1 COVID-19: Primary | ICD-10-CM

## 2021-08-18 LAB
ALBUMIN SERPL-MCNC: 3.9 G/DL (ref 3.5–5.2)
ALBUMIN/GLOB SERPL: 1.2 G/DL
ALP SERPL-CCNC: 66 U/L (ref 39–117)
ALT SERPL W P-5'-P-CCNC: 43 U/L (ref 1–41)
ANION GAP SERPL CALCULATED.3IONS-SCNC: 10.3 MMOL/L (ref 5–15)
AST SERPL-CCNC: 22 U/L (ref 1–40)
BASOPHILS # BLD AUTO: 0.02 10*3/MM3 (ref 0–0.2)
BASOPHILS NFR BLD AUTO: 0.3 % (ref 0–1.5)
BILIRUB SERPL-MCNC: 0.5 MG/DL (ref 0–1.2)
BUN SERPL-MCNC: 19 MG/DL (ref 8–23)
BUN/CREAT SERPL: 19.8 (ref 7–25)
CALCIUM SPEC-SCNC: 9 MG/DL (ref 8.6–10.5)
CHLORIDE SERPL-SCNC: 93 MMOL/L (ref 98–107)
CO2 SERPL-SCNC: 26.7 MMOL/L (ref 22–29)
CREAT SERPL-MCNC: 0.96 MG/DL (ref 0.76–1.27)
D-LACTATE SERPL-SCNC: 1.7 MMOL/L (ref 0.5–2)
DEPRECATED RDW RBC AUTO: 38 FL (ref 37–54)
EOSINOPHIL # BLD AUTO: 0.01 10*3/MM3 (ref 0–0.4)
EOSINOPHIL NFR BLD AUTO: 0.2 % (ref 0.3–6.2)
ERYTHROCYTE [DISTWIDTH] IN BLOOD BY AUTOMATED COUNT: 13.1 % (ref 12.3–15.4)
GFR SERPL CREATININE-BSD FRML MDRD: 78 ML/MIN/1.73
GLOBULIN UR ELPH-MCNC: 3.3 GM/DL
GLUCOSE SERPL-MCNC: 273 MG/DL (ref 65–99)
HCT VFR BLD AUTO: 42.4 % (ref 37.5–51)
HGB BLD-MCNC: 14.7 G/DL (ref 13–17.7)
HOLD SPECIMEN: NORMAL
HOLD SPECIMEN: NORMAL
IMM GRANULOCYTES # BLD AUTO: 0.09 10*3/MM3 (ref 0–0.05)
IMM GRANULOCYTES NFR BLD AUTO: 1.4 % (ref 0–0.5)
LYMPHOCYTES # BLD AUTO: 0.92 10*3/MM3 (ref 0.7–3.1)
LYMPHOCYTES NFR BLD AUTO: 14 % (ref 19.6–45.3)
MCH RBC QN AUTO: 28.1 PG (ref 26.6–33)
MCHC RBC AUTO-ENTMCNC: 34.7 G/DL (ref 31.5–35.7)
MCV RBC AUTO: 81.1 FL (ref 79–97)
MONOCYTES # BLD AUTO: 0.52 10*3/MM3 (ref 0.1–0.9)
MONOCYTES NFR BLD AUTO: 7.9 % (ref 5–12)
NEUTROPHILS NFR BLD AUTO: 4.99 10*3/MM3 (ref 1.7–7)
NEUTROPHILS NFR BLD AUTO: 76.2 % (ref 42.7–76)
NRBC BLD AUTO-RTO: 0 /100 WBC (ref 0–0.2)
NT-PROBNP SERPL-MCNC: 75.3 PG/ML (ref 0–900)
PLATELET # BLD AUTO: 185 10*3/MM3 (ref 140–450)
PMV BLD AUTO: 9.9 FL (ref 6–12)
POTASSIUM SERPL-SCNC: 4.5 MMOL/L (ref 3.5–5.2)
PROT SERPL-MCNC: 7.2 G/DL (ref 6–8.5)
QT INTERVAL: 341 MS
RBC # BLD AUTO: 5.23 10*6/MM3 (ref 4.14–5.8)
SODIUM SERPL-SCNC: 130 MMOL/L (ref 136–145)
TROPONIN T SERPL-MCNC: <0.01 NG/ML (ref 0–0.03)
WBC # BLD AUTO: 6.55 10*3/MM3 (ref 3.4–10.8)
WHOLE BLOOD HOLD SPECIMEN: NORMAL

## 2021-08-18 PROCEDURE — 85025 COMPLETE CBC W/AUTO DIFF WBC: CPT | Performed by: EMERGENCY MEDICINE

## 2021-08-18 PROCEDURE — 80053 COMPREHEN METABOLIC PANEL: CPT | Performed by: EMERGENCY MEDICINE

## 2021-08-18 PROCEDURE — 83605 ASSAY OF LACTIC ACID: CPT | Performed by: EMERGENCY MEDICINE

## 2021-08-18 PROCEDURE — 83880 ASSAY OF NATRIURETIC PEPTIDE: CPT | Performed by: EMERGENCY MEDICINE

## 2021-08-18 PROCEDURE — 93005 ELECTROCARDIOGRAM TRACING: CPT | Performed by: EMERGENCY MEDICINE

## 2021-08-18 PROCEDURE — 87040 BLOOD CULTURE FOR BACTERIA: CPT | Performed by: EMERGENCY MEDICINE

## 2021-08-18 PROCEDURE — 93010 ELECTROCARDIOGRAM REPORT: CPT | Performed by: INTERNAL MEDICINE

## 2021-08-18 PROCEDURE — 71045 X-RAY EXAM CHEST 1 VIEW: CPT

## 2021-08-18 PROCEDURE — 99283 EMERGENCY DEPT VISIT LOW MDM: CPT

## 2021-08-18 PROCEDURE — 84484 ASSAY OF TROPONIN QUANT: CPT | Performed by: EMERGENCY MEDICINE

## 2021-08-18 RX ORDER — SODIUM CHLORIDE 0.9 % (FLUSH) 0.9 %
10 SYRINGE (ML) INJECTION AS NEEDED
Status: DISCONTINUED | OUTPATIENT
Start: 2021-08-18 | End: 2021-08-18

## 2021-08-18 RX ORDER — SODIUM CHLORIDE 0.9 % (FLUSH) 0.9 %
10 SYRINGE (ML) INJECTION AS NEEDED
Status: DISCONTINUED | OUTPATIENT
Start: 2021-08-18 | End: 2021-08-18 | Stop reason: HOSPADM

## 2021-08-18 RX ORDER — BUDESONIDE 180 UG/1
2 AEROSOL, POWDER RESPIRATORY (INHALATION)
Qty: 1 EACH | Refills: 0 | Status: SHIPPED | OUTPATIENT
Start: 2021-08-18 | End: 2022-10-04

## 2021-08-18 RX ADMIN — SODIUM CHLORIDE, PRESERVATIVE FREE 10 ML: 5 INJECTION INTRAVENOUS at 07:52

## 2021-08-18 NOTE — ED PROVIDER NOTES
Subjective   Quentin Hart is a 65 y.o. male who presents to the emergency department today with complaints of worsening shortness of breath over the past couple weeks. Pt states he was diagnosed with COVID on 08/10 and has been short of breath since. Pt was seen in this ED on  08/16 for SOB as well where he was discharged after treatment. Pt states he has a home SPO2 monitor and saw his SAT was 89%, thus becoming concerned. He follows with [facility], for his primary care needs. He denies chills, diaphoresis, myalgias, nausea, emesis, cough, fever, headache, diarrhea, or any other pertinent sx or concerns.           Review of Systems   Constitutional: Negative for chills and fever.   HENT: Negative for nosebleeds.    Eyes: Negative for redness.   Respiratory: Positive for shortness of breath. Negative for cough.    Cardiovascular: Negative for chest pain.   Gastrointestinal: Negative for diarrhea and vomiting.   Genitourinary: Negative for dysuria and frequency.   Musculoskeletal: Negative for back pain and neck pain.   Skin: Negative for rash.   Neurological: Negative for seizures.       Past Medical History:   Diagnosis Date   • Diabetes mellitus (CMS/HCC)    • Hypertension        Allergies   Allergen Reactions   • Morphine Nausea And Vomiting       History reviewed. No pertinent surgical history.    History reviewed. No pertinent family history.    Social History     Socioeconomic History   • Marital status:      Spouse name: Not on file   • Number of children: Not on file   • Years of education: Not on file   • Highest education level: Not on file   Tobacco Use   • Smoking status: Former Smoker   Substance and Sexual Activity   • Alcohol use: Not Currently   • Drug use: Never   • Sexual activity: Defer         Objective   Physical Exam  Vitals and nursing note reviewed.   Constitutional:       General: He is not in acute distress.  HENT:      Head: Normocephalic and atraumatic.      Nose: Nose  normal.      Mouth/Throat:      Mouth: Mucous membranes are moist.   Eyes:      General: No scleral icterus.  Cardiovascular:      Rate and Rhythm: Normal rate and regular rhythm.      Heart sounds: Normal heart sounds. No murmur heard.     Pulmonary:      Effort: No respiratory distress.      Breath sounds: Rales (in bases, greater on the right) present.   Abdominal:      Palpations: Abdomen is soft.      Tenderness: There is no abdominal tenderness.   Musculoskeletal:         General: No tenderness. Normal range of motion.      Cervical back: Normal range of motion and neck supple. No tenderness.      Right lower leg: No edema.      Left lower leg: No edema.   Skin:     General: Skin is warm and dry.   Neurological:      General: No focal deficit present.      Mental Status: He is alert.      Sensory: No sensory deficit.      Motor: No weakness.   Psychiatric:         Behavior: Behavior normal.         Procedures         ED Course     Labs Reviewed   COMPREHENSIVE METABOLIC PANEL - Abnormal; Notable for the following components:       Result Value    Glucose 273 (*)     Sodium 130 (*)     Chloride 93 (*)     ALT (SGPT) 43 (*)     All other components within normal limits    Narrative:     GFR Normal >60  Chronic Kidney Disease <60  Kidney Failure <15     CBC WITH AUTO DIFFERENTIAL - Abnormal; Notable for the following components:    Neutrophil % 76.2 (*)     Lymphocyte % 14.0 (*)     Eosinophil % 0.2 (*)     Immature Grans % 1.4 (*)     Immature Grans, Absolute 0.09 (*)     All other components within normal limits   LACTIC ACID, PLASMA - Normal   BNP (IN-HOUSE) - Normal    Narrative:     Among patients with dyspnea, NT-proBNP is highly sensitive for the detection of acute congestive heart failure. In addition NT-proBNP of <300 pg/ml effectively rules out acute congestive heart failure with 99% negative predictive value.    Results may be falsely decreased if patient taking Biotin.     TROPONIN (IN-HOUSE) - Normal     Narrative:     Troponin T Reference Range:  <= 0.03 ng/mL-   Negative for AMI  >0.03 ng/mL-     Abnormal for myocardial necrosis.  Clinicians would have to utilize clinical acumen, EKG, Troponin and serial changes to determine if it is an Acute Myocardial Infarction or myocardial injury due to an underlying chronic condition.       Results may be falsely decreased if patient taking Biotin.     BLOOD CULTURE   BLOOD CULTURE   RAINBOW DRAW    Narrative:     The following orders were created for panel order [facility] Draw.  Procedure                               Abnormality         Status                     ---------                               -----------         ------                     Green Top (Gel)[[id number]]                                  Final result               Lavender Top[[id number]]                                     Final result               Gold Top - SST[[id number]]                                   Final result                 Please view results for these tests on the individual orders.   CBC AND DIFFERENTIAL    Narrative:     The following orders were created for panel order CBC & Differential.  Procedure                               Abnormality         Status                     ---------                               -----------         ------                     CBC Auto Differential[[id number]]        Abnormal            Final result                 Please view results for these tests on the individual orders.   GREEN TOP   LAVENDER TOP   GOLD TOP - SST     XR Chest 1 View    Result Date: 8/18/2021  PROCEDURE: XR CHEST 1 VW  COMPARISON: [facility], , XR CHEST 1 VW, 8/16/2021, 11:53.  INDICATIONS: SHORTNESS OF BREATH. COVID POSITIVE.  FINDINGS:  Lordotic positioning.  Overlying artifacts.  Low lung volumes with subtle hazy bibasilar opacities.  No pneumothorax.  Unchanged cardiomediastinal contours.  No acute osseous abnormality is identified.  CONCLUSION: Low lung  volumes with subtle hazy bibasilar opacities that could represent atelectasis and/or pneumonia.       ERIC TODD MD       Electronically Signed and Approved By: ERIC TODD MD on 8/18/2021 at 7:36                                                    MDM  65-year-old male patient who is diagnosed with COVID-19 has been symptomatic for approximately 10 days.  Patient recently finished a course of antibiotics and is still on his course of oral steroids.  Patient's chest x-ray shows a subtle opacity possibly consistent with COVID-19.  His previous ED visit on the 16th had a normal x-ray.  Patient is able to maintain a pulse ox even with ambulation on room air of 92%.  The patient's lab work otherwise shows a elevated glucose but no other significant abnormalities.  Patient is stable for discharge.  I will send a prescription for Pulmicort inhaler for the patient and he was strongly advised to return for any difficulty in breathing.  Time of discharge the patient is in no respiratory distress.      Final diagnoses:   COVID-19       Documentation assistance provided by vesna Cee.  Information recorded by the vesna was done at my direction and has been verified and validated by me.     Isaura Cee  08/18/21 [id number]       Bobby More DO  08/18/21 [id number]

## 2021-08-21 LAB
BACTERIA SPEC AEROBE CULT: NORMAL
BACTERIA SPEC AEROBE CULT: NORMAL

## 2021-08-23 LAB
BACTERIA SPEC AEROBE CULT: NORMAL
BACTERIA SPEC AEROBE CULT: NORMAL

## 2021-08-25 LAB
QT INTERVAL: 365 MS
QT INTERVAL: 375 MS

## 2022-05-16 DIAGNOSIS — F41.9 ANXIETY: Primary | ICD-10-CM

## 2022-05-16 RX ORDER — DIAZEPAM 10 MG/1
10 TABLET ORAL
Qty: 1 TABLET | Refills: 0 | Status: SHIPPED | OUTPATIENT
Start: 2022-05-16 | End: 2022-10-04 | Stop reason: ALTCHOICE

## 2022-05-26 ENCOUNTER — HOSPITAL ENCOUNTER (OUTPATIENT)
Dept: GENERAL RADIOLOGY | Facility: HOSPITAL | Age: 67
Discharge: HOME | End: 2022-05-26
Admitting: ORTHOPAEDIC SURGERY

## 2022-05-26 ENCOUNTER — OFFICE VISIT (OUTPATIENT)
Dept: ORTHOPEDIC SURGERY | Facility: CLINIC | Age: 67
End: 2022-05-26

## 2022-05-26 VITALS — WEIGHT: 240 LBS | HEIGHT: 72 IN | BODY MASS INDEX: 32.51 KG/M2

## 2022-05-26 DIAGNOSIS — M25.561 PAIN IN BOTH KNEES, UNSPECIFIED CHRONICITY: ICD-10-CM

## 2022-05-26 DIAGNOSIS — Z96.651 STATUS POST TOTAL KNEE REPLACEMENT, RIGHT: ICD-10-CM

## 2022-05-26 DIAGNOSIS — M25.462 EFFUSION, LEFT KNEE: Primary | ICD-10-CM

## 2022-05-26 DIAGNOSIS — M25.562 PAIN IN BOTH KNEES, UNSPECIFIED CHRONICITY: ICD-10-CM

## 2022-05-26 DIAGNOSIS — Z96.651 STATUS POST TOTAL KNEE REPLACEMENT, RIGHT: Primary | ICD-10-CM

## 2022-05-26 DIAGNOSIS — M17.12 PRIMARY OSTEOARTHRITIS OF LEFT KNEE: ICD-10-CM

## 2022-05-26 PROCEDURE — 99204 OFFICE O/P NEW MOD 45 MIN: CPT | Performed by: ORTHOPAEDIC SURGERY

## 2022-05-26 PROCEDURE — 73560 X-RAY EXAM OF KNEE 1 OR 2: CPT

## 2022-05-26 RX ORDER — MECLIZINE HYDROCHLORIDE 25 MG/1
25 TABLET ORAL 3 TIMES DAILY PRN
COMMUNITY
Start: 2022-04-04

## 2022-05-26 RX ORDER — METOPROLOL SUCCINATE 25 MG/1
TABLET, EXTENDED RELEASE ORAL
COMMUNITY
Start: 2022-04-15 | End: 2022-10-04

## 2022-05-26 RX ORDER — FAMOTIDINE 20 MG/1
20 TABLET, FILM COATED ORAL DAILY
COMMUNITY
Start: 2022-04-15

## 2022-05-26 NOTE — PROGRESS NOTES
Chief Complaint  Pain of the Left Knee and Pain of the Right Knee    Subjective    History of Present Illness      Quentin Hart is a 66 y.o. male who presents to [facility] for follow-up on right total knee arthroplasty and severe persistent left knee pain.  History of Present Illness the patient had a right total knee replacement performed by Dr. Chris Silvestre in 1996.  He has had difficulty with that knee for several years now.  By the end of the day he feels the knee is unstable and is buckling and giving out.  We have talked about revision surgery on that knee several times in the past.  Unfortunately the patient has developed dizziness which has made him fall a couple of different times.  He states that he would like to get his right knee revised but is concerned about the medical issues related to his dizziness.  I will try and get the MRI report for the patient for his cranial MRI.  He also states that his left knee has bothered him significantly over the past few months.  He is progressively losing mobility and his quality of life is very negative because he cannot walk for exercise because of left knee pain and discomfort.  He has a lot of pain and discomfort going up and down the steps and squatting on the ground.  The knee tends to buckle and give out from underneath him.  He has tried to use a brace and anti-inflammatory medication as well as intra-articular injections but they seem not to be helping him and he is now ready to proceed with knee replacement surgery once the issues with his dizziness have settled down.  Pain Location:  BILATERAL knee  Radiation: none  Quality: aching, stabbing, burning, grinding, shooting  Intensity/Severity: moderate  Duration: several years  Progression of symptoms: yes, progressive worsening  Onset quality: gradual   Timing: intermittent  Aggravating Factors: going up and down stairs, kneeling, rising after sitting, walking, squatting,  "standing  Alleviating Factors: rest  Previous Episodes: yes  Associated Symptoms: pain, swelling, decreased ROM, decreased strength  ADLs Affected: dressing, ambulating, recreational activities/sports, meal preparation  Previous Treatment: NSAIDs and prior surgery       Objective   Vital Signs:   Ht 182.9 cm (72\")   Wt 109 kg (240 lb)   BMI 32.55 kg/m²     Physical Exam  Physical Exam  Vitals signs and nursing note reviewed.   Constitutional:       Appearance: Normal appearance.   Pulmonary:      Effort: Pulmonary effort is normal.   Skin:     General: Skin is warm and dry.      Capillary Refill: Capillary refill takes less than 2 seconds.   Neurological:      General: No focal deficit present.      Mental Status: He is alert and oriented to person, place, and time. Mental status is at baseline.   Psychiatric:         Mood and Affect: Mood normal.         Behavior: Behavior normal.         Thought Content: Thought content normal.         Judgment: Judgment normal.     Ortho Exam   Left knee (varus). Patient has crepitus throughout range of motion. Positive patellar grind test. Mild effusion. Lachman is negative. Pivot shift is negative. Anterior and posterior drawer signs are negative. Significant joint line tenderness is noted on the medial aspect of the knee. Patient has a varus orientation of the knee. There is fullness and tenderness in the Popliteal fossa. Mild distention of a Popliteal cyst is noted in this location. Range of motion in flexion is from 0-100 degrees. Neurovascular status is intact.  Dorsalis pedis and posterior tibial artery pulses are palpable. Common peroneal nerve function is well preserved. Patient's gait is cautious and antalgic. Skin and soft tissues are mildly swollen, consistent with synovitis and effusion. The patient has a significant limp with the first few steps after starting the gait cycle. Getting out of a chair takes a lot of effort due to pain on knee flexion.     Right " knee.The patient is status post total knee arthroplasty postoperative 24 year(s). Incision is clean. Calf is soft and nontender. Homans sign is negative. There is no clicking, popping or catching. Anterior and posterior drawer signs are negative.  There is no instability of the components. Appropriate amounts of swelling and bruising are noted. Dorsalis pedis and posterior tibial artery pulses are palpable. Common peroneal nerve function is well preserved. Range of motion is from 0-110 degrees of flexion. Gait is cautious but otherwise fairly normal. There is no evidence of a deep seated joint infection. Performed By Dr. Cesar          Result Review :   The following data was reviewed by: Cholo Mckeon MD on 05/26/2022:  Radiologic studies - see below for interpretation  BILATERAL knee xrays  weightbearing/standing ap/lateral views were ordered by Cholo Mckeon MD. Performed at [facility] on 05/26/22. Images were independently viewed and interpreted by myself, my impression as follows:    right Knee X-Ray  Indication: evaluation of right total knee arthroplasty  AP, Lateral views  Findings: Well-placed implants with a good cement mantle without any subsidence of the implants.  no bony lesion  Soft tissues within normal limits  within normal limits joint spaces  Hardware appropriately positioned yes      no prior studies available for comparison.      X-RAY was ordered and reviewed by Cholo Mckeon MD      left Knee X-Ray  Indication: Advanced osteoarthritis with complete loss of joint space and bone-on-bone appearance both over the medial compartment as well as over the patellofemoral articulation.  AP, Lateral views  Findings: Varus deformity of the knee with bone-on-bone appearance noted.  no bony lesion  Soft tissues within normal limits  decreased joint spaces  Hardware appropriately positioned not applicable      yes prior studies available for comparison.    This patient's x-ray report  was graded according to the Kellgren and Jethro classification.  This took into account the joint space narrowing, osteophyte formation, sclerosis of the distal femur/proximal tibia along with deformity of those bones.  The findings were indicative of K L grade 3.    X-RAY was ordered and reviewed by Cholo Mckeon MD    Procedures           Assessment   Assessment and Plan    There are no diagnoses linked to this encounter.      Follow Up   · Compression/brace  · Rest, ice, compression, and elevation (RICE) therapy  · Stretching and strengthening exercises  · OTC Tylenol 500-1000mg by mouth every 6 hours as needed for pain   · Follow up in 6 week(s)  • Patient was given instructions and counseling regarding his condition or for health maintenance advice. Please see specific information pulled into the AVS if appropriate.   The patient was seen today for preoperative discussion.  The patient has been tried on over-the-counter and prescription NSAID's despite the risks of anti-inflammatory bleeding, peptic ulcers and erosive gastritis with short term benefit only.  Braces have been prescribed for mechanical support.  Patient has been participating in an exercise program specifically targeting joint pain relief with limited benefit. Intraarticular injections have been used periodically with some but not complete relief of pain.  Ambulation aids have also been utilized.      • The details of the surgical procedure were explained including the location of probable incisions and a description of the likely hardware/grafts to be used. The patient understands the likely convalescence after surgery as well as the rehabilitation required.  Also, we have thoroughly discussed with the patient the risks, benefits and alternatives to surgery.  Risks include but are not limited to the risk of infection, joint stiffness, limited range of motion, wound healing problems, scar tissue build up, myocardial infarction, stroke, blood  clots (including DVT and/or pulmonary embolus along with the risk of death) neurologic and/or vascular injury, limb length discrepancy, fracture, dislocation, nonunion, malunion, continued pain and need for further surgery including hardware failure requiring revision.     Cholo Mckeon MD   Date of Encounter: 5/26/2022        EMR Dragon/Transcription disclaimer:  Much of this encounter note is an electronic transcription/translation of spoken language to printed text. The electronic translation of spoken language may permit erroneous, or at times, nonsensical words or phrases to be inadvertently transcribed; Although I have reviewed the note for such errors, some may still exist.

## 2022-05-31 PROBLEM — M17.12 PRIMARY OSTEOARTHRITIS OF LEFT KNEE: Status: ACTIVE | Noted: 2022-05-31

## 2022-05-31 RX ORDER — CEFAZOLIN SODIUM 2 G/100ML
2 INJECTION, SOLUTION INTRAVENOUS ONCE
Status: CANCELLED | OUTPATIENT
Start: 2022-10-07 | End: 2022-05-31

## 2022-05-31 RX ORDER — CHLORHEXIDINE GLUCONATE 500 MG/1
CLOTH TOPICAL DAILY
Status: CANCELLED | OUTPATIENT
Start: 2022-05-31

## 2022-06-02 ENCOUNTER — TELEPHONE (OUTPATIENT)
Dept: ORTHOPEDIC SURGERY | Facility: CLINIC | Age: 67
End: 2022-06-02

## 2022-06-02 NOTE — TELEPHONE ENCOUNTER
Caller: SHEA    Relationship: PCP OFFICE    Best call back number: [phone]    What form or medical record are you requestin/26 OFFICE NOTES    Who is requesting this form or medical record from you: PCP OFFICE    How would you like to receive the form or medical records (pick-up, mail, fax): FAX  If fax, what is the fax number: [phone]    Timeframe paperwork needed: ASAP    Additional notes: N/A

## 2022-06-14 ENCOUNTER — TELEPHONE (OUTPATIENT)
Dept: ORTHOPEDIC SURGERY | Facility: CLINIC | Age: 67
End: 2022-06-14

## 2022-06-21 ENCOUNTER — TELEPHONE (OUTPATIENT)
Dept: ORTHOPEDIC SURGERY | Facility: CLINIC | Age: 67
End: 2022-06-21

## 2022-06-21 NOTE — TELEPHONE ENCOUNTER
PATIENT'S WIFE STATES THAT THE PATIENT HAS SOME OTHER ISSUES GOING ON RIGHT NOW AND THAT WHEN HE IS ABLE TO PROCEED WITH THE SURGERY THEY WILL CALL BACK TO SCHEDULE./AW

## 2022-09-29 ENCOUNTER — TELEPHONE (OUTPATIENT)
Dept: ORTHOPEDIC SURGERY | Facility: HOSPITAL | Age: 67
End: 2022-09-29

## 2022-09-29 NOTE — TELEPHONE ENCOUNTER
Risk Factor yes no   Age >75  X   BMI <20 >40  X   Patient History     Chronic Pain (2 or more meds/Pain Management)  X   ETOH (more than 3 drinks Daily)  X   Uncontrolled Depression/Bipolar/Schizoaffective Disorder  X   Arrhythmias  X   Stent placement/MI X    DVT/PE  X   Pacemaker  X   HTN (uncontrolled or requiring more than 2 medications)  X   CHF/Retained fluids/Edema  X   Stroke with Residual   X   COPD/Asthma  X   PURVI--Non-compliant with CPAP  X   Diabetes (on insulin or more than 2 meds)         A1C:  X   BPH/Urinary retention (on medication)  X   CKD  X   Home environment and support     Current ambulation status (use of cane, walker, W/C, Multiple falls/weakness)  X   Stairs to enter and throughout home X    Lives Alone  X   Doesn’t have support at home  X     Outpatient Screening Assessment    Home needs: (Walker/BSC):  Has walker/cane/elevated seat/shower chair  ? Steps  no steps   Caregiver 24-48hrs post-discharge: Home with wife    Discharge Plan:  [facility] --Requesting [facility]    Prescriptions: Other pharmacy    Home medications:   ? Blood thinner/anti-coag therapy--ASA, Plavix  ? BPH or diuretic--  ? BP meds-- Metoprolol, Lotrel   ? Pain/Anti-inflammatories--  On Pulmicort   DM--on Metformin    Pre-op Education:  Educate patient on spinal anesthesia/pain control:  ? patient verbalize understanding    Educate patient on hospital course/timeline:  ?  patient verbalize understanding    Joint Care Class:  ?  yes ? no      Notes:   Patient’s wife did tell me that he had a stent placed earlier this year, and they were waiting on cardiology clearance. She said she still would like him to come home if possible but she had her knee done and the MD wanted her to stay, because she had 4 stents but she would have been fine coming home.   She did have a question on when to stop his medications. She was going to try to get ahold of cardiology as it has been almost a week, saw in the chart where it said to  stop Plavix 5 days prior but wasn’t sure about the ASA and wanted to ask Dr. Mckeon on that as well.  There aren’t any recent labs in the chart as he doesn’t have a PAT appointment until 10/4 before his surgery 10/7.

## 2022-10-04 ENCOUNTER — HOSPITAL ENCOUNTER (OUTPATIENT)
Dept: GENERAL RADIOLOGY | Facility: HOSPITAL | Age: 67
Discharge: HOME | End: 2022-10-04

## 2022-10-04 ENCOUNTER — PRE-ADMISSION TESTING (OUTPATIENT)
Dept: PREADMISSION TESTING | Facility: HOSPITAL | Age: 67
End: 2022-10-04

## 2022-10-04 VITALS
TEMPERATURE: 97.8 F | RESPIRATION RATE: 16 BRPM | HEIGHT: 72 IN | WEIGHT: 246 LBS | SYSTOLIC BLOOD PRESSURE: 145 MMHG | DIASTOLIC BLOOD PRESSURE: 81 MMHG | OXYGEN SATURATION: 98 % | HEART RATE: 74 BPM | BODY MASS INDEX: 33.32 KG/M2

## 2022-10-04 DIAGNOSIS — M17.12 PRIMARY OSTEOARTHRITIS OF LEFT KNEE: ICD-10-CM

## 2022-10-04 LAB
ABO GROUP BLD: NORMAL
ANION GAP SERPL CALCULATED.3IONS-SCNC: 9.8 MMOL/L (ref 5–15)
BILIRUB UR QL STRIP: NEGATIVE
BLD GP AB SCN SERPL QL: NEGATIVE
BUN SERPL-MCNC: 12 MG/DL (ref 8–23)
BUN/CREAT SERPL: 13.2 (ref 7–25)
CALCIUM SPEC-SCNC: 9.4 MG/DL (ref 8.6–10.5)
CHLORIDE SERPL-SCNC: 103 MMOL/L (ref 98–107)
CLARITY UR: CLEAR
CO2 SERPL-SCNC: 26.2 MMOL/L (ref 22–29)
COLOR UR: YELLOW
CREAT SERPL-MCNC: 0.91 MG/DL (ref 0.76–1.27)
DEPRECATED RDW RBC AUTO: 43 FL (ref 37–54)
EGFRCR SERPLBLD CKD-EPI 2021: 91.2 ML/MIN/1.73
ERYTHROCYTE [DISTWIDTH] IN BLOOD BY AUTOMATED COUNT: 13.6 % (ref 12.3–15.4)
GLUCOSE SERPL-MCNC: 150 MG/DL (ref 65–99)
GLUCOSE UR STRIP-MCNC: NEGATIVE MG/DL
HCT VFR BLD AUTO: 43.1 % (ref 37.5–51)
HGB BLD-MCNC: 13.8 G/DL (ref 13–17.7)
HGB UR QL STRIP.AUTO: NEGATIVE
KETONES UR QL STRIP: NEGATIVE
LEUKOCYTE ESTERASE UR QL STRIP.AUTO: NEGATIVE
MCH RBC QN AUTO: 27.6 PG (ref 26.6–33)
MCHC RBC AUTO-ENTMCNC: 32 G/DL (ref 31.5–35.7)
MCV RBC AUTO: 86.2 FL (ref 79–97)
NITRITE UR QL STRIP: NEGATIVE
PH UR STRIP.AUTO: 5.5 [PH] (ref 5–8)
PLATELET # BLD AUTO: 187 10*3/MM3 (ref 140–450)
PMV BLD AUTO: 10.4 FL (ref 6–12)
POTASSIUM SERPL-SCNC: 4.3 MMOL/L (ref 3.5–5.2)
PROT UR QL STRIP: NEGATIVE
RBC # BLD AUTO: 5 10*6/MM3 (ref 4.14–5.8)
RH BLD: POSITIVE
SODIUM SERPL-SCNC: 139 MMOL/L (ref 136–145)
SP GR UR STRIP: 1.02 (ref 1–1.03)
T&S EXPIRATION DATE: NORMAL
UROBILINOGEN UR QL STRIP: NORMAL
WBC NRBC COR # BLD: 5.71 10*3/MM3 (ref 3.4–10.8)

## 2022-10-04 PROCEDURE — 85027 COMPLETE CBC AUTOMATED: CPT

## 2022-10-04 PROCEDURE — 36415 COLL VENOUS BLD VENIPUNCTURE: CPT

## 2022-10-04 PROCEDURE — 86900 BLOOD TYPING SEROLOGIC ABO: CPT

## 2022-10-04 PROCEDURE — 81003 URINALYSIS AUTO W/O SCOPE: CPT

## 2022-10-04 PROCEDURE — 86850 RBC ANTIBODY SCREEN: CPT

## 2022-10-04 PROCEDURE — 80048 BASIC METABOLIC PNL TOTAL CA: CPT

## 2022-10-04 PROCEDURE — 86901 BLOOD TYPING SEROLOGIC RH(D): CPT

## 2022-10-04 PROCEDURE — 73560 X-RAY EXAM OF KNEE 1 OR 2: CPT

## 2022-10-04 RX ORDER — PRASUGREL 10 MG/1
10 TABLET, FILM COATED ORAL DAILY
COMMUNITY
Start: 2022-07-18

## 2022-10-04 RX ORDER — CHLORHEXIDINE GLUCONATE 500 MG/1
CLOTH TOPICAL DAILY
Status: ACTIVE | OUTPATIENT
Start: 2022-10-04

## 2022-10-04 RX ORDER — PANTOPRAZOLE SODIUM 40 MG/1
40 TABLET, DELAYED RELEASE ORAL EVERY MORNING
COMMUNITY
Start: 2022-06-10

## 2022-10-04 RX ORDER — CHLORHEXIDINE GLUCONATE 2 G/100ML
SOLUTION TOPICAL
Status: ON HOLD | COMMUNITY
End: 2022-10-07

## 2022-10-04 RX ORDER — GLIMEPIRIDE 1 MG/1
1 TABLET ORAL
COMMUNITY
Start: 2022-07-19

## 2022-10-04 ASSESSMENT — KOOS JR
KOOS JR SCORE: 47.487
KOOS JR SCORE: 16

## 2022-10-04 NOTE — DISCHARGE INSTRUCTIONS
Take the following medications the morning of surgery:  PANTOPRAZOLE  (BRING UPDATED MEDICINE LIST DAY OF SURGERY)      ARRIVE 5:15 AM  10/7          If you are on prescription narcotic pain medication to control your pain you may also take that medication the morning of surgery.    General Instructions:  Do not eat solid food after midnight the night before surgery.  You may drink clear liquids day of surgery but must stop at least one hour before your hospital arrival time.  It is beneficial for you to have a clear drink that contains carbohydrates the day of surgery.  We suggest a 12 to 20 ounce bottle of Gatorade or Powerade for non-diabetic patients or a 12 to 20 ounce bottle of G2 or Powerade Zero for diabetic patients. (Pediatric patients, are not advised to drink a 12 to 20 ounce carbohydrate drink)    Clear liquids are liquids you can see through.  Nothing red in color.     Plain water                               Sports drinks  Sodas                                   Gelatin (Jell-O)  Fruit juices without pulp such as white grape juice and apple juice  Popsicles that contain no fruit or yogurt  Tea or coffee (no cream or milk added)  Gatorade / Powerade  G2 / Powerade Zero    Infants may have breast milk up to four hours before surgery.  Infants drinking formula may drink formula up to six hours before surgery.   Patients who avoid smoking, chewing tobacco and alcohol for 4 weeks prior to surgery have a reduced risk of post-operative complications.  Quit smoking as many days before surgery as you can.  Do not smoke, use chewing tobacco or drink alcohol the day of surgery.   If applicable bring your C-PAP/ BI-PAP machine.  Bring any papers given to you in the doctor’s office.  Wear clean comfortable clothes.  Do not wear contact lenses, false eyelashes or make-up.  Bring a case for your glasses.   Bring crutches or walker if applicable.  Remove all piercings.  Leave jewelry and any other valuables at  home.  Hair extensions with metal clips must be removed prior to surgery.  The Pre-Admission Testing nurse will instruct you to bring medications if unable to obtain an accurate list in Pre-Admission Testing.        If you were given a blood bank ID arm band remember to bring it with you the day of surgery.    Preventing a Surgical Site Infection:  For 2 to 3 days before surgery, avoid shaving with a razor because the razor can irritate skin and make it easier to develop an infection.    Any areas of open skin can increase the risk of a post-operative wound infection by allowing bacteria to enter and travel throughout the body.  Notify your surgeon if you have any skin wounds / rashes even if it is not near the expected surgical site.  The area will need assessed to determine if surgery should be delayed until it is healed.  The night prior to surgery shower using a fresh bar of anti-bacterial soap (such as Dial) and clean washcloth.  Sleep in a clean bed with clean clothing.  Do not allow pets to sleep with you.  Shower on the morning of surgery using a fresh bar of anti-bacterial soap (such as Dial) and clean washcloth.  Dry with a clean towel and dress in clean clothing.  Ask your surgeon if you will be receiving antibiotics prior to surgery.  Make sure you, your family, and all healthcare providers clean their hands with soap and water or an alcohol based hand  before caring for you or your wound.    Day of surgery:  Your arrival time is approximately two hours before your scheduled surgery time.  Upon arrival, a Pre-op nurse and Anesthesiologist will review your health history, obtain vital signs, and answer questions you may have.  The only belongings needed at this time will be a list of your home medications and if applicable your C-PAP/BI-PAP machine.  A Pre-op nurse will start an IV and you may receive medication in preparation for surgery, including something to help you relax.     Please be  aware that surgery does come with discomfort.  We want to make every effort to control your discomfort so please discuss any uncontrolled symptoms with your nurse.   Your doctor will most likely have prescribed pain medications.      If you are going home after surgery you will receive individualized written care instructions before being discharged.  A responsible adult must drive you to and from the hospital on the day of your surgery and stay with you for 24 hours.  Discharge prescriptions can be filled by the hospital pharmacy during regular pharmacy hours.  If you are having surgery late in the day/evening your prescription may be e-prescribed to your pharmacy.  Please verify your pharmacy hours or chose a 24 hour pharmacy to avoid not having access to your prescription because your pharmacy has closed for the day.    If you are staying overnight following surgery, you will be transported to your hospital room following the recovery period.  [facility] has all private rooms.    If you have any questions please call Pre-Admission Testing at [phone].  Deductibles and co-payments are collected on the day of service. Please be prepared to pay the required co-pay, deductible or deposit on the day of service as defined by your plan.    Call your surgeon immediately if you experience any of the following symptoms:  Sore Throat  Shortness of Breath or difficulty breathing  Cough  Chills  Body soreness or muscle pain  Headache  Fever  New loss of taste or smell  Do not arrive for your surgery ill.  Your procedure will need to be rescheduled to another time.  You will need to call your physician before the day of surgery to avoid any unnecessary exposure to hospital staff as well as other patients.       CHLORHEXIDINE CLOTH INSTRUCTIONS  The morning of surgery follow these instructions using the Chlorhexidine cloths you've been given.  These steps reduce bacteria on the body.  Do not use the cloths  near your eyes, ears mouth, genitalia or on open wounds.  Throw the cloths away after use but do not try to flush them down a toilet.      Open and remove one cloth at a time from the package.    Leave the cloth unfolded and begin the bathing.  Massage the skin with the cloths using gentle pressure to remove bacteria.  Do not scrub harshly.   Follow the steps below with one 2% CHG cloth per area (6 total cloths).  One cloth for neck, shoulders and chest.  One cloth for both arms, hands, fingers and underarms (do underarms last).  One cloth for the abdomen followed by groin.  One cloth for right leg and foot including between the toes.  One cloth for left leg and foot including between the toes.  The last cloth is to be used for the back of the neck, back and buttocks.    Allow the CHG to air dry 3 minutes on the skin which will give it time to work and decrease the chance of irritation.  The skin may feel sticky until it is dry.  Do not rinse with water or any other liquid or you will lose the beneficial effects of the CHG.  If mild skin irritation occurs, do rinse the skin to remove the CHG.  Report this to the nurse at time of admission.  Do not apply lotions, creams, ointments, deodorants or perfumes after using the clothes. Dress in clean clothes before coming to the hospital.    BACTROBAN NASAL OINTMENT  There are many germs normally in your nose. Bactroban is an ointment that will help reduce these germs. Please follow these instructions for Bactroban use:      ____The day before surgery in the morning  Date________    ____The day before surgery in the evening              Date________    ____The day of surgery in the morning    Date________    **Squirt ½ package of Bactroban Ointment onto a cotton applicator and apply to inside of 1st nostril.  Squirt the remaining Bactroban and apply to the inside of the other nostril.

## 2022-10-05 ENCOUNTER — TELEPHONE (OUTPATIENT)
Dept: ORTHOPEDIC SURGERY | Facility: CLINIC | Age: 67
End: 2022-10-05

## 2022-10-05 NOTE — DISCHARGE PLACEMENT REQUEST
"Junior Frye (67 y.o. Male)             Date of Birth   1955    Social Security Number       Address   [address] [place] [id number]    Home Phone   [phone]    MRN   [id number]       P[employer]   Alevism    Marital Status                               Admission Date       Admission Type   Elective    Admitting Provider   Cholo Mckeon MD    Attending Provider   Cholo Mckeon MD    Department, Room/Bed   [facility], --/--       Discharge Date       Discharge Disposition       Discharge Destination                               Attending Provider: Cholo Mckeon MD    Allergies: Morphine, Adenosine    Isolation: None   Infection: None   Code Status: Not on file   Advance Care Planning Activity    Ht: 182.9 cm (72\")   Wt: 112 kg (246 lb)    Admission Cmt: None   Principal Problem: Primary osteoarthritis of left knee [M17.12]                 Active Insurance as of 10/7/2022     Primary Coverage     Payor Plan Insurance Group Employer/Plan Group    MEDICARE MEDICARE A & B      Payor Plan Address Payor Plan Phone Number Payor Plan Fax Number Effective Dates    [address] [phone]  8/1/2018 - None Entered    [facility] [id number]       Subscriber Name Subscriber Birth Date Member ID       JUNIOR FRYE 1955 [id number]           Secondary Coverage     Payor Plan Insurance Group Employer/Plan Group    [employer]TH [employer] [facility] access hospital SUPP [id number]     Payor Plan Address Payor Plan Phone Number Payor Plan Fax Number Effective Dates    [address]   8/1/2018 - None Entered    [facility] [id number]       Subscriber Name Subscriber Birth Date Member ID       JUNIOR FRYE 1955 [id number]                 Emergency Contacts      (Rel.) Home Phone Work Phone Mobile Phone    FryeAlice patel (Spouse) [phone] -- [phone]              "

## 2022-10-05 NOTE — TELEPHONE ENCOUNTER
PATIENT'S WIFE CALLED AND IS CONCERNED THAT WE WON'T HAVE PATIENT'S CARDIAC CLEARANCE IN TIME FOR PATIENT'S SURGERY ON 10/07/22.  [employer] COMPUTERS HAVE BEEN DOWN TODAY.  PLEASE CALL PATIENT'S WIFE TO DISCUSS OPTIONS

## 2022-10-06 NOTE — CASE MANAGEMENT/SOCIAL WORK
Continued Stay Note  [facility]     Patient Name: Quentin Hart  MRN: [id number]  Today's Date: 10/6/2022    Admit Date: (Not on file)    Plan: Home with [facility]   Discharge Plan     Row Name 10/06/22 1605       Plan    Plan Home with [facility]               Discharge Codes    No documentation.                     Shannon Epley, RN

## 2022-10-07 ENCOUNTER — APPOINTMENT (OUTPATIENT)
Dept: GENERAL RADIOLOGY | Facility: HOSPITAL | Age: 67
End: 2022-10-07

## 2022-10-07 ENCOUNTER — HOSPITAL ENCOUNTER (OUTPATIENT)
Facility: HOSPITAL | Age: 67
Discharge: HOME | End: 2022-10-08
Attending: ORTHOPAEDIC SURGERY | Admitting: ORTHOPAEDIC SURGERY

## 2022-10-07 ENCOUNTER — ANESTHESIA (OUTPATIENT)
Dept: PERIOP | Facility: HOSPITAL | Age: 67
End: 2022-10-07

## 2022-10-07 ENCOUNTER — ANESTHESIA EVENT (OUTPATIENT)
Dept: PERIOP | Facility: HOSPITAL | Age: 67
End: 2022-10-07

## 2022-10-07 DIAGNOSIS — M17.12 PRIMARY OSTEOARTHRITIS OF LEFT KNEE: ICD-10-CM

## 2022-10-07 DIAGNOSIS — Z96.651 STATUS POST TOTAL KNEE REPLACEMENT, RIGHT: Primary | ICD-10-CM

## 2022-10-07 PROBLEM — I10 HTN (HYPERTENSION): Status: ACTIVE | Noted: 2022-10-07

## 2022-10-07 PROBLEM — E11.9 DM (DIABETES MELLITUS), TYPE 2 (HCC): Status: ACTIVE | Noted: 2022-10-07

## 2022-10-07 LAB
GLUCOSE BLDC GLUCOMTR-MCNC: 117 MG/DL (ref 70–130)
GLUCOSE BLDC GLUCOMTR-MCNC: 287 MG/DL (ref 70–130)
GLUCOSE BLDC GLUCOMTR-MCNC: 304 MG/DL (ref 70–130)

## 2022-10-07 PROCEDURE — C1776 JOINT DEVICE (IMPLANTABLE): HCPCS | Performed by: ORTHOPAEDIC SURGERY

## 2022-10-07 PROCEDURE — 25010000002 CEFAZOLIN PER 500 MG: Performed by: ORTHOPAEDIC SURGERY

## 2022-10-07 PROCEDURE — 25010000002 ONDANSETRON PER 1 MG: Performed by: NURSE ANESTHETIST, CERTIFIED REGISTERED

## 2022-10-07 PROCEDURE — G0378 HOSPITAL OBSERVATION PER HR: HCPCS

## 2022-10-07 PROCEDURE — S0260 H&P FOR SURGERY: HCPCS | Performed by: ORTHOPAEDIC SURGERY

## 2022-10-07 PROCEDURE — C1713 ANCHOR/SCREW BN/BN,TIS/BN: HCPCS | Performed by: ORTHOPAEDIC SURGERY

## 2022-10-07 PROCEDURE — 97116 GAIT TRAINING THERAPY: CPT

## 2022-10-07 PROCEDURE — 25010000002 NEOSTIGMINE 5 MG/10ML SOLUTION: Performed by: NURSE ANESTHETIST, CERTIFIED REGISTERED

## 2022-10-07 PROCEDURE — 25010000002 MIDAZOLAM PER 1 MG: Performed by: ANESTHESIOLOGY

## 2022-10-07 PROCEDURE — 97161 PT EVAL LOW COMPLEX 20 MIN: CPT

## 2022-10-07 PROCEDURE — 27447 TOTAL KNEE ARTHROPLASTY: CPT | Performed by: ORTHOPAEDIC SURGERY

## 2022-10-07 PROCEDURE — 82962 GLUCOSE BLOOD TEST: CPT

## 2022-10-07 PROCEDURE — 25010000002 CEFAZOLIN IN DEXTROSE 2-4 GM/100ML-% SOLUTION: Performed by: ORTHOPAEDIC SURGERY

## 2022-10-07 PROCEDURE — C9290 INJ, BUPIVACAINE LIPOSOME: HCPCS | Performed by: ORTHOPAEDIC SURGERY

## 2022-10-07 PROCEDURE — 25010000002 DEXAMETHASONE SODIUM PHOSPHATE 20 MG/5ML SOLUTION: Performed by: NURSE ANESTHETIST, CERTIFIED REGISTERED

## 2022-10-07 PROCEDURE — 25010000002 ROPIVACAINE PER 1 MG: Performed by: ORTHOPAEDIC SURGERY

## 2022-10-07 PROCEDURE — 63710000001 INSULIN LISPRO (HUMAN) PER 5 UNITS: Performed by: INTERNAL MEDICINE

## 2022-10-07 PROCEDURE — 73560 X-RAY EXAM OF KNEE 1 OR 2: CPT

## 2022-10-07 PROCEDURE — 25010000002 FENTANYL CITRATE (PF) 100 MCG/2ML SOLUTION: Performed by: NURSE ANESTHETIST, CERTIFIED REGISTERED

## 2022-10-07 PROCEDURE — 0 BUPIVACAINE LIPOSOME 1.3 % SUSPENSION: Performed by: ORTHOPAEDIC SURGERY

## 2022-10-07 PROCEDURE — 25010000002 ROPIVACAINE PER 1 MG: Performed by: ANESTHESIOLOGY

## 2022-10-07 PROCEDURE — 25010000002 FENTANYL CITRATE (PF) 50 MCG/ML SOLUTION: Performed by: ANESTHESIOLOGY

## 2022-10-07 PROCEDURE — 25010000002 PROPOFOL 10 MG/ML EMULSION: Performed by: NURSE ANESTHETIST, CERTIFIED REGISTERED

## 2022-10-07 PROCEDURE — 76942 ECHO GUIDE FOR BIOPSY: CPT | Performed by: ORTHOPAEDIC SURGERY

## 2022-10-07 DEVICE — EXT STEM FEM/KN PERSONA TPR 14XPLS30MM: Type: IMPLANTABLE DEVICE | Site: KNEE | Status: FUNCTIONAL

## 2022-10-07 DEVICE — KNOTLESS TISSUE CONTROL DEVICE, UNDYED UNIDIRECTIONAL (ANTIBACTERIAL) SYNTHETIC ABSORBABLE DEVICE
Type: IMPLANTABLE DEVICE | Site: KNEE | Status: FUNCTIONAL
Brand: STRATAFIX

## 2022-10-07 DEVICE — KNOTLESS TISSUE CONTROL DEVICE, VIOLET UNIDIRECTIONAL (ANTIBACTERIAL) SYNTHETIC ABSORBABLE DEVICE
Type: IMPLANTABLE DEVICE | Site: KNEE | Status: FUNCTIONAL
Brand: STRATAFIX

## 2022-10-07 DEVICE — STEM TIB/KN PERSONA CMT 5D SZF LT: Type: IMPLANTABLE DEVICE | Site: KNEE | Status: FUNCTIONAL

## 2022-10-07 DEVICE — VIOLET ANTIBACTERIAL POLYDIOXANONE, KNOTLESS TISSUE CONTROL DEVICE
Type: IMPLANTABLE DEVICE | Site: KNEE | Status: FUNCTIONAL
Brand: STRATAFIX

## 2022-10-07 DEVICE — SEAL HEMO SURG ARISTA/AH ABS/PWDR 3GM: Type: IMPLANTABLE DEVICE | Site: KNEE | Status: FUNCTIONAL

## 2022-10-07 DEVICE — CMT BONE REFOBACIN R W/GENT 1X40: Type: IMPLANTABLE DEVICE | Site: KNEE | Status: FUNCTIONAL

## 2022-10-07 DEVICE — COMP FEM/KN PERSONA CR CMT COCR STD SZ10 LT: Type: IMPLANTABLE DEVICE | Site: KNEE | Status: FUNCTIONAL

## 2022-10-07 DEVICE — ART/SRF KN PERSONA/VE PS EF 8TO11 10MM LT: Type: IMPLANTABLE DEVICE | Site: KNEE | Status: FUNCTIONAL

## 2022-10-07 DEVICE — CAP EXT STEM KN UPCHRG: Type: IMPLANTABLE DEVICE | Site: KNEE | Status: FUNCTIONAL

## 2022-10-07 DEVICE — CAP TOTL KN CMT PRIMARY: Type: IMPLANTABLE DEVICE | Site: KNEE | Status: FUNCTIONAL

## 2022-10-07 DEVICE — PAT KN PERSONA VE CRS/LNK CMT 9.5X38MM: Type: IMPLANTABLE DEVICE | Site: KNEE | Status: FUNCTIONAL

## 2022-10-07 RX ORDER — MAGNESIUM HYDROXIDE 1200 MG/15ML
LIQUID ORAL AS NEEDED
Status: DISCONTINUED | OUTPATIENT
Start: 2022-10-07 | End: 2022-10-07 | Stop reason: HOSPADM

## 2022-10-07 RX ORDER — ONDANSETRON 4 MG/1
4 TABLET, FILM COATED ORAL EVERY 6 HOURS PRN
Status: DISCONTINUED | OUTPATIENT
Start: 2022-10-07 | End: 2022-10-08 | Stop reason: HOSPADM

## 2022-10-07 RX ORDER — BISACODYL 10 MG
10 SUPPOSITORY, RECTAL RECTAL DAILY PRN
Status: DISCONTINUED | OUTPATIENT
Start: 2022-10-07 | End: 2022-10-08 | Stop reason: HOSPADM

## 2022-10-07 RX ORDER — BISACODYL 5 MG/1
10 TABLET, DELAYED RELEASE ORAL DAILY PRN
Status: DISCONTINUED | OUTPATIENT
Start: 2022-10-07 | End: 2022-10-08 | Stop reason: HOSPADM

## 2022-10-07 RX ORDER — LIDOCAINE HYDROCHLORIDE 20 MG/ML
INJECTION, SOLUTION INFILTRATION; PERINEURAL AS NEEDED
Status: DISCONTINUED | OUTPATIENT
Start: 2022-10-07 | End: 2022-10-07 | Stop reason: SURG

## 2022-10-07 RX ORDER — DIPHENHYDRAMINE HCL 25 MG
25 CAPSULE ORAL
Status: DISCONTINUED | OUTPATIENT
Start: 2022-10-07 | End: 2022-10-07 | Stop reason: HOSPADM

## 2022-10-07 RX ORDER — FENTANYL CITRATE 50 UG/ML
50 INJECTION, SOLUTION INTRAMUSCULAR; INTRAVENOUS
Status: DISCONTINUED | OUTPATIENT
Start: 2022-10-07 | End: 2022-10-07 | Stop reason: HOSPADM

## 2022-10-07 RX ORDER — CEFAZOLIN SODIUM 2 G/100ML
2 INJECTION, SOLUTION INTRAVENOUS EVERY 8 HOURS
Status: COMPLETED | OUTPATIENT
Start: 2022-10-07 | End: 2022-10-08

## 2022-10-07 RX ORDER — IBUPROFEN 600 MG/1
600 TABLET ORAL ONCE AS NEEDED
Status: DISCONTINUED | OUTPATIENT
Start: 2022-10-07 | End: 2022-10-07 | Stop reason: HOSPADM

## 2022-10-07 RX ORDER — SODIUM CHLORIDE, SODIUM LACTATE, POTASSIUM CHLORIDE, CALCIUM CHLORIDE 600; 310; 30; 20 MG/100ML; MG/100ML; MG/100ML; MG/100ML
75 INJECTION, SOLUTION INTRAVENOUS CONTINUOUS
Status: DISCONTINUED | OUTPATIENT
Start: 2022-10-07 | End: 2022-10-08 | Stop reason: HOSPADM

## 2022-10-07 RX ORDER — POLYETHYLENE GLYCOL 3350 17 G/17G
17 POWDER, FOR SOLUTION ORAL DAILY
Status: DISCONTINUED | OUTPATIENT
Start: 2022-10-07 | End: 2022-10-08 | Stop reason: HOSPADM

## 2022-10-07 RX ORDER — PROMETHAZINE HYDROCHLORIDE 25 MG/1
25 TABLET ORAL ONCE AS NEEDED
Status: DISCONTINUED | OUTPATIENT
Start: 2022-10-07 | End: 2022-10-07 | Stop reason: HOSPADM

## 2022-10-07 RX ORDER — NALOXONE HCL 0.4 MG/ML
0.2 VIAL (ML) INJECTION AS NEEDED
Status: DISCONTINUED | OUTPATIENT
Start: 2022-10-07 | End: 2022-10-07 | Stop reason: HOSPADM

## 2022-10-07 RX ORDER — PRASUGREL 10 MG/1
10 TABLET, FILM COATED ORAL DAILY
Status: DISCONTINUED | OUTPATIENT
Start: 2022-10-08 | End: 2022-10-08 | Stop reason: HOSPADM

## 2022-10-07 RX ORDER — FLUMAZENIL 0.1 MG/ML
0.2 INJECTION INTRAVENOUS AS NEEDED
Status: DISCONTINUED | OUTPATIENT
Start: 2022-10-07 | End: 2022-10-07 | Stop reason: HOSPADM

## 2022-10-07 RX ORDER — HYDROCODONE BITARTRATE AND ACETAMINOPHEN 7.5; 325 MG/1; MG/1
1 TABLET ORAL ONCE AS NEEDED
Status: DISCONTINUED | OUTPATIENT
Start: 2022-10-07 | End: 2022-10-07 | Stop reason: HOSPADM

## 2022-10-07 RX ORDER — NEOSTIGMINE METHYLSULFATE 0.5 MG/ML
INJECTION, SOLUTION INTRAVENOUS AS NEEDED
Status: DISCONTINUED | OUTPATIENT
Start: 2022-10-07 | End: 2022-10-07 | Stop reason: SURG

## 2022-10-07 RX ORDER — PROPOFOL 10 MG/ML
VIAL (ML) INTRAVENOUS AS NEEDED
Status: DISCONTINUED | OUTPATIENT
Start: 2022-10-07 | End: 2022-10-07 | Stop reason: SURG

## 2022-10-07 RX ORDER — SODIUM CHLORIDE 0.9 % (FLUSH) 0.9 %
3-10 SYRINGE (ML) INJECTION AS NEEDED
Status: DISCONTINUED | OUTPATIENT
Start: 2022-10-07 | End: 2022-10-07 | Stop reason: HOSPADM

## 2022-10-07 RX ORDER — ACETAMINOPHEN 500 MG
1000 TABLET ORAL ONCE
Status: COMPLETED | OUTPATIENT
Start: 2022-10-07 | End: 2022-10-07

## 2022-10-07 RX ORDER — ROPIVACAINE HYDROCHLORIDE 2 MG/ML
INJECTION, SOLUTION EPIDURAL; INFILTRATION; PERINEURAL
Status: COMPLETED | OUTPATIENT
Start: 2022-10-07 | End: 2022-10-07

## 2022-10-07 RX ORDER — INSULIN LISPRO 100 [IU]/ML
0-7 INJECTION, SOLUTION INTRAVENOUS; SUBCUTANEOUS
Status: DISCONTINUED | OUTPATIENT
Start: 2022-10-07 | End: 2022-10-08 | Stop reason: HOSPADM

## 2022-10-07 RX ORDER — ROPIVACAINE HYDROCHLORIDE 5 MG/ML
INJECTION, SOLUTION EPIDURAL; INFILTRATION; PERINEURAL AS NEEDED
Status: DISCONTINUED | OUTPATIENT
Start: 2022-10-07 | End: 2022-10-07 | Stop reason: HOSPADM

## 2022-10-07 RX ORDER — OXYCODONE AND ACETAMINOPHEN 7.5; 325 MG/1; MG/1
1 TABLET ORAL EVERY 4 HOURS PRN
Status: DISCONTINUED | OUTPATIENT
Start: 2022-10-07 | End: 2022-10-07 | Stop reason: HOSPADM

## 2022-10-07 RX ORDER — EPHEDRINE SULFATE 50 MG/ML
5 INJECTION, SOLUTION INTRAVENOUS ONCE AS NEEDED
Status: DISCONTINUED | OUTPATIENT
Start: 2022-10-07 | End: 2022-10-07 | Stop reason: HOSPADM

## 2022-10-07 RX ORDER — FAMOTIDINE 10 MG/ML
20 INJECTION, SOLUTION INTRAVENOUS ONCE
Status: COMPLETED | OUTPATIENT
Start: 2022-10-07 | End: 2022-10-07

## 2022-10-07 RX ORDER — SODIUM CHLORIDE, SODIUM LACTATE, POTASSIUM CHLORIDE, CALCIUM CHLORIDE 600; 310; 30; 20 MG/100ML; MG/100ML; MG/100ML; MG/100ML
9 INJECTION, SOLUTION INTRAVENOUS CONTINUOUS
Status: DISCONTINUED | OUTPATIENT
Start: 2022-10-07 | End: 2022-10-07

## 2022-10-07 RX ORDER — LABETALOL HYDROCHLORIDE 5 MG/ML
5 INJECTION, SOLUTION INTRAVENOUS
Status: DISCONTINUED | OUTPATIENT
Start: 2022-10-07 | End: 2022-10-07 | Stop reason: HOSPADM

## 2022-10-07 RX ORDER — FENTANYL CITRATE 50 UG/ML
INJECTION, SOLUTION INTRAMUSCULAR; INTRAVENOUS AS NEEDED
Status: DISCONTINUED | OUTPATIENT
Start: 2022-10-07 | End: 2022-10-07 | Stop reason: SURG

## 2022-10-07 RX ORDER — DEXAMETHASONE SODIUM PHOSPHATE 4 MG/ML
INJECTION, SOLUTION INTRA-ARTICULAR; INTRALESIONAL; INTRAMUSCULAR; INTRAVENOUS; SOFT TISSUE AS NEEDED
Status: DISCONTINUED | OUTPATIENT
Start: 2022-10-07 | End: 2022-10-07 | Stop reason: SURG

## 2022-10-07 RX ORDER — ONDANSETRON 2 MG/ML
INJECTION INTRAMUSCULAR; INTRAVENOUS AS NEEDED
Status: DISCONTINUED | OUTPATIENT
Start: 2022-10-07 | End: 2022-10-07 | Stop reason: SURG

## 2022-10-07 RX ORDER — NICOTINE POLACRILEX 4 MG
15 LOZENGE BUCCAL
Status: DISCONTINUED | OUTPATIENT
Start: 2022-10-07 | End: 2022-10-08 | Stop reason: HOSPADM

## 2022-10-07 RX ORDER — ASPIRIN 81 MG/1
81 TABLET ORAL DAILY
Status: DISCONTINUED | OUTPATIENT
Start: 2022-10-08 | End: 2022-10-08 | Stop reason: HOSPADM

## 2022-10-07 RX ORDER — FAMOTIDINE 20 MG/1
20 TABLET, FILM COATED ORAL DAILY
Status: DISCONTINUED | OUTPATIENT
Start: 2022-10-07 | End: 2022-10-08 | Stop reason: HOSPADM

## 2022-10-07 RX ORDER — HYDROMORPHONE HYDROCHLORIDE 1 MG/ML
0.5 INJECTION, SOLUTION INTRAMUSCULAR; INTRAVENOUS; SUBCUTANEOUS
Status: DISCONTINUED | OUTPATIENT
Start: 2022-10-07 | End: 2022-10-07 | Stop reason: HOSPADM

## 2022-10-07 RX ORDER — DIPHENHYDRAMINE HYDROCHLORIDE 50 MG/ML
12.5 INJECTION INTRAMUSCULAR; INTRAVENOUS
Status: DISCONTINUED | OUTPATIENT
Start: 2022-10-07 | End: 2022-10-07 | Stop reason: HOSPADM

## 2022-10-07 RX ORDER — OXYCODONE HYDROCHLORIDE AND ACETAMINOPHEN 5; 325 MG/1; MG/1
2 TABLET ORAL EVERY 4 HOURS PRN
Status: DISCONTINUED | OUTPATIENT
Start: 2022-10-07 | End: 2022-10-08 | Stop reason: HOSPADM

## 2022-10-07 RX ORDER — NALOXONE HCL 0.4 MG/ML
0.1 VIAL (ML) INJECTION
Status: DISCONTINUED | OUTPATIENT
Start: 2022-10-07 | End: 2022-10-08 | Stop reason: HOSPADM

## 2022-10-07 RX ORDER — ONDANSETRON 2 MG/ML
4 INJECTION INTRAMUSCULAR; INTRAVENOUS EVERY 6 HOURS PRN
Status: DISCONTINUED | OUTPATIENT
Start: 2022-10-07 | End: 2022-10-08 | Stop reason: HOSPADM

## 2022-10-07 RX ORDER — DEXTROSE MONOHYDRATE 25 G/50ML
25 INJECTION, SOLUTION INTRAVENOUS
Status: DISCONTINUED | OUTPATIENT
Start: 2022-10-07 | End: 2022-10-08 | Stop reason: HOSPADM

## 2022-10-07 RX ORDER — LIDOCAINE HYDROCHLORIDE 10 MG/ML
0.5 INJECTION, SOLUTION EPIDURAL; INFILTRATION; INTRACAUDAL; PERINEURAL ONCE AS NEEDED
Status: DISCONTINUED | OUTPATIENT
Start: 2022-10-07 | End: 2022-10-07 | Stop reason: HOSPADM

## 2022-10-07 RX ORDER — ROPIVACAINE HYDROCHLORIDE 5 MG/ML
INJECTION, SOLUTION EPIDURAL; INFILTRATION; PERINEURAL
Status: COMPLETED | OUTPATIENT
Start: 2022-10-07 | End: 2022-10-07

## 2022-10-07 RX ORDER — SODIUM CHLORIDE 0.9 % (FLUSH) 0.9 %
3 SYRINGE (ML) INJECTION EVERY 12 HOURS SCHEDULED
Status: DISCONTINUED | OUTPATIENT
Start: 2022-10-07 | End: 2022-10-07 | Stop reason: HOSPADM

## 2022-10-07 RX ORDER — ACETAMINOPHEN 325 MG/1
325 TABLET ORAL EVERY 4 HOURS PRN
Status: DISCONTINUED | OUTPATIENT
Start: 2022-10-07 | End: 2022-10-08 | Stop reason: HOSPADM

## 2022-10-07 RX ORDER — OXYCODONE AND ACETAMINOPHEN 7.5; 325 MG/1; MG/1
1 TABLET ORAL EVERY 4 HOURS PRN
Qty: 50 TABLET | Refills: 0 | Status: SHIPPED | OUTPATIENT
Start: 2022-10-07 | End: 2022-10-18 | Stop reason: SDUPTHER

## 2022-10-07 RX ORDER — DOCUSATE SODIUM 100 MG/1
100 CAPSULE, LIQUID FILLED ORAL 2 TIMES DAILY
Status: DISCONTINUED | OUTPATIENT
Start: 2022-10-07 | End: 2022-10-08 | Stop reason: HOSPADM

## 2022-10-07 RX ORDER — ONDANSETRON 2 MG/ML
4 INJECTION INTRAMUSCULAR; INTRAVENOUS ONCE AS NEEDED
Status: DISCONTINUED | OUTPATIENT
Start: 2022-10-07 | End: 2022-10-07 | Stop reason: HOSPADM

## 2022-10-07 RX ORDER — OXYCODONE HYDROCHLORIDE AND ACETAMINOPHEN 5; 325 MG/1; MG/1
1 TABLET ORAL EVERY 4 HOURS PRN
Status: DISCONTINUED | OUTPATIENT
Start: 2022-10-07 | End: 2022-10-08 | Stop reason: HOSPADM

## 2022-10-07 RX ORDER — GLYCOPYRROLATE 0.2 MG/ML
INJECTION INTRAMUSCULAR; INTRAVENOUS AS NEEDED
Status: DISCONTINUED | OUTPATIENT
Start: 2022-10-07 | End: 2022-10-07 | Stop reason: SURG

## 2022-10-07 RX ORDER — ROCURONIUM BROMIDE 10 MG/ML
INJECTION, SOLUTION INTRAVENOUS AS NEEDED
Status: DISCONTINUED | OUTPATIENT
Start: 2022-10-07 | End: 2022-10-07 | Stop reason: SURG

## 2022-10-07 RX ORDER — MIDAZOLAM HYDROCHLORIDE 1 MG/ML
0.5 INJECTION INTRAMUSCULAR; INTRAVENOUS
Status: DISCONTINUED | OUTPATIENT
Start: 2022-10-07 | End: 2022-10-07 | Stop reason: HOSPADM

## 2022-10-07 RX ORDER — PROMETHAZINE HYDROCHLORIDE 25 MG/1
25 SUPPOSITORY RECTAL ONCE AS NEEDED
Status: DISCONTINUED | OUTPATIENT
Start: 2022-10-07 | End: 2022-10-07 | Stop reason: HOSPADM

## 2022-10-07 RX ORDER — CEFAZOLIN SODIUM 2 G/100ML
2 INJECTION, SOLUTION INTRAVENOUS ONCE
Status: COMPLETED | OUTPATIENT
Start: 2022-10-07 | End: 2022-10-07

## 2022-10-07 RX ORDER — HYDRALAZINE HYDROCHLORIDE 20 MG/ML
5 INJECTION INTRAMUSCULAR; INTRAVENOUS
Status: DISCONTINUED | OUTPATIENT
Start: 2022-10-07 | End: 2022-10-07 | Stop reason: HOSPADM

## 2022-10-07 RX ORDER — HYDROMORPHONE HYDROCHLORIDE 1 MG/ML
0.5 INJECTION, SOLUTION INTRAMUSCULAR; INTRAVENOUS; SUBCUTANEOUS
Status: DISCONTINUED | OUTPATIENT
Start: 2022-10-07 | End: 2022-10-08 | Stop reason: HOSPADM

## 2022-10-07 RX ADMIN — DEXAMETHASONE SODIUM PHOSPHATE 8 MG: 4 INJECTION, SOLUTION INTRAMUSCULAR; INTRAVENOUS at 07:44

## 2022-10-07 RX ADMIN — LIDOCAINE HYDROCHLORIDE 60 MG: 20 INJECTION, SOLUTION INFILTRATION; PERINEURAL at 07:41

## 2022-10-07 RX ADMIN — ACETAMINOPHEN 1000 MG: 500 TABLET ORAL at 06:47

## 2022-10-07 RX ADMIN — MIDAZOLAM 1 MG: 1 INJECTION, SOLUTION INTRAMUSCULAR; INTRAVENOUS at 06:56

## 2022-10-07 RX ADMIN — ROPIVACAINE HYDROCHLORIDE 27 ML: 5 INJECTION EPIDURAL; INFILTRATION; PERINEURAL at 07:06

## 2022-10-07 RX ADMIN — FAMOTIDINE 20 MG: 10 INJECTION INTRAVENOUS at 06:48

## 2022-10-07 RX ADMIN — CEFAZOLIN SODIUM 2 G: 2 INJECTION, SOLUTION INTRAVENOUS at 23:11

## 2022-10-07 RX ADMIN — ROCURONIUM BROMIDE 40 MG: 50 INJECTION INTRAVENOUS at 07:42

## 2022-10-07 RX ADMIN — INSULIN LISPRO 5 UNITS: 100 INJECTION, SOLUTION INTRAVENOUS; SUBCUTANEOUS at 16:43

## 2022-10-07 RX ADMIN — FENTANYL CITRATE 50 MCG: 50 INJECTION, SOLUTION INTRAMUSCULAR; INTRAVENOUS at 07:43

## 2022-10-07 RX ADMIN — FENTANYL CITRATE 50 MCG: 50 INJECTION INTRAMUSCULAR; INTRAVENOUS at 06:56

## 2022-10-07 RX ADMIN — DOCUSATE SODIUM 100 MG: 100 CAPSULE, LIQUID FILLED ORAL at 20:44

## 2022-10-07 RX ADMIN — SODIUM CHLORIDE, POTASSIUM CHLORIDE, SODIUM LACTATE AND CALCIUM CHLORIDE 9 ML/HR: 600; 310; 30; 20 INJECTION, SOLUTION INTRAVENOUS at 06:39

## 2022-10-07 RX ADMIN — PROPOFOL 200 MG: 10 INJECTION, EMULSION INTRAVENOUS at 07:41

## 2022-10-07 RX ADMIN — DOCUSATE SODIUM 100 MG: 100 CAPSULE, LIQUID FILLED ORAL at 13:31

## 2022-10-07 RX ADMIN — MUPIROCIN 1 APPLICATION: 20 OINTMENT TOPICAL at 13:35

## 2022-10-07 RX ADMIN — AMLODIPINE BESYLATE: 10 TABLET ORAL at 20:44

## 2022-10-07 RX ADMIN — OXYCODONE HYDROCHLORIDE AND ACETAMINOPHEN 1 TABLET: 5; 325 TABLET ORAL at 20:45

## 2022-10-07 RX ADMIN — MUPIROCIN 1 APPLICATION: 20 OINTMENT TOPICAL at 20:44

## 2022-10-07 RX ADMIN — NEOSTIGMINE METHYLSULFATE 3 MG: 0.5 INJECTION INTRAVENOUS at 09:10

## 2022-10-07 RX ADMIN — CEFAZOLIN SODIUM 2 G: 2 INJECTION, SOLUTION INTRAVENOUS at 07:31

## 2022-10-07 RX ADMIN — ONDANSETRON 4 MG: 2 INJECTION INTRAMUSCULAR; INTRAVENOUS at 09:11

## 2022-10-07 RX ADMIN — GLYCOPYRROLATE 0.4 MG: 1 INJECTION INTRAMUSCULAR; INTRAVENOUS at 09:10

## 2022-10-07 RX ADMIN — FENTANYL CITRATE 50 MCG: 50 INJECTION, SOLUTION INTRAMUSCULAR; INTRAVENOUS at 08:10

## 2022-10-07 RX ADMIN — PROPOFOL 25 MCG/KG/MIN: 10 INJECTION, EMULSION INTRAVENOUS at 07:45

## 2022-10-07 RX ADMIN — CEFAZOLIN SODIUM 2 G: 2 INJECTION, SOLUTION INTRAVENOUS at 16:24

## 2022-10-07 RX ADMIN — ROPIVACAINE HYDROCHLORIDE 13 ML: 2 INJECTION, SOLUTION EPIDURAL; INFILTRATION at 07:06

## 2022-10-07 NOTE — CONSULTS
A Consult H&P    Patient Care Team:  Augusto Ronquillo MD as PCP - General (Internal Medicine)    Requesting Physician: Dr. Mckeon    Reason for Consult: Postoperative management of hypertension and diabetes    History of Present Illness    This is a very pleasant 67-year-old male with history of hypertension, diabetes, CAD with recent cardiac stenting 3 months ago as well as sleep apnea who underwent elective left total knee arthroplasty today.  At present he states he feels great with no pain.  He is wanting to get up and walk around more.  He has already eaten and tolerated resumption of diet without issue.  He denies any new shortness of breath, chest pain, palpitations.  No nausea or vomiting.  He has been off his Effient and aspirin for the last 5 days prior to surgery which was cleared by his cardiologist preoperatively.  He denies any other interruptions of his usual home medications.  He denies any dizziness or lightheadedness.    Past Medical History:   Diagnosis Date   • Coronary artery disease    • Diabetes mellitus (HCC)    • Dizziness    • GERD (gastroesophageal reflux disease)    • History of 2019 novel coronavirus disease (COVID-19) 01/2022    AND 8/2021   • History of brain concussion    • History of recent fall    • [place] (hard of hearing)    • Hypertension    • Knee pain    • Neck fracture (HCC)     X2   • Obstructive sleep apnea     NO LONGER USING CPAP DUE TO RECALL   • Osteoarthritis    • PONV (postoperative nausea and vomiting)    • Prediabetes    • SOB (shortness of breath) on exertion      Past Surgical History:   Procedure Laterality Date   • COLONOSCOPY     • CORONARY ANGIOPLASTY WITH STENT PLACEMENT     • THUMB ARTHROSCOPY     • TONSILLECTOMY     • TOTAL KNEE ARTHROPLASTY Right 1996    DR MALDONADO     Family History   Problem Relation Age of Onset   • Malig Hyperthermia Neg Hx      Social History     Tobacco Use   • Smoking status: Former Smoker   • Smokeless tobacco: Never Used   Substance Use  Topics   • Alcohol use: Not Currently   • Drug use: Never     Medications Prior to Admission   Medication Sig Dispense Refill Last Dose   • amLODIPine-benazepril (LOTREL) 10-40 MG per capsule Take 1 capsule by mouth Daily.   10/6/2022   • famotidine (PEPCID) 20 MG tablet Take 20 mg by mouth Daily. PT UNSURE IF TAKING   Past Month at Unknown time   • glimepiride (AMARYL) 1 MG tablet Take 1 mg by mouth Every Morning Before Breakfast.   Past Week at Unknown time   • meclizine (ANTIVERT) 25 MG tablet Take 25 mg by mouth 3 (Three) Times a Day As Needed.   Past Week at Unknown time   • metFORMIN (GLUCOPHAGE) 500 MG tablet Take 500 mg by mouth 2 (Two) Times a Day With Meals.   10/6/2022   • pantoprazole (PROTONIX) 40 MG EC tablet Take 40 mg by mouth Every Morning.   10/7/2022   • ASPIRIN 81 PO Take 81 mg by mouth Daily. PT ADVISED TO CONTACT SURGEON REGARDING HOLDING PRIOR TO SURGERY   9/30/2022   • Cholecalciferol (VITAMIN D) 1000 units tablet Take 5,000 Units by mouth Daily.   9/30/2022   • Omega-3 Fatty Acids (FISH OIL) 1000 MG capsule capsule Take 1,000 mg by mouth Daily With Breakfast. PT HOLDING FOR SURGERY   9/30/2022   • prasugrel (EFFIENT) 10 MG tablet Take 10 mg by mouth Daily. PT HOLDING PRIOR TO SURGERY PER MD INSTRUCTIONS   9/30/2022     Allergies:  Morphine and Adenosine    Review of Systems   Constitutional: Negative for chills, fatigue and fever.   HENT: Negative for congestion, sore throat and trouble swallowing.    Eyes: Negative for visual disturbance.   Respiratory: Negative for cough, chest tightness and shortness of breath.    Cardiovascular: Negative for chest pain, palpitations and leg swelling.   Gastrointestinal: Negative for abdominal pain, blood in stool, constipation, diarrhea, nausea and vomiting.   Endocrine: Negative for polydipsia and polyuria.   Genitourinary: Negative for difficulty urinating, dysuria, frequency, hematuria and urgency.   Musculoskeletal: Negative for arthralgias, joint  swelling and myalgias.   Skin: Negative for rash and wound.   Neurological: Negative for dizziness, weakness, light-headedness and numbness.        PHYSICAL EXAM    Vital Signs  tMax 24 hrs:  Temp (24hrs), Av.9 °F (36.6 °C), Min:97.6 °F (36.4 °C), Max:98.3 °F (36.8 °C)    Vitals Ranges:  Temp:  [97.6 °F (36.4 °C)-98.3 °F (36.8 °C)] 97.8 °F (36.6 °C)  Heart Rate:  [58-78] 60  Resp:  [16-18] 16  BP: (116-149)/(69-86) 132/78    Physical Exam  Vitals and nursing note reviewed.   Constitutional:       Appearance: He is well-developed.   HENT:      Head: Normocephalic and atraumatic.   Eyes:      Pupils: Pupils are equal, round, and reactive to light.   Neck:      Trachea: No tracheal deviation.   Cardiovascular:      Rate and Rhythm: Normal rate and regular rhythm.      Heart sounds: No murmur heard.    No gallop.   Pulmonary:      Effort: Pulmonary effort is normal. No respiratory distress.      Breath sounds: No wheezing.   Abdominal:      General: Bowel sounds are normal. There is no distension.      Palpations: Abdomen is soft.      Tenderness: There is no abdominal tenderness.   Musculoskeletal:         General: No tenderness.      Cervical back: Neck supple.      Comments: Left knee is dressed and wrapped with ice pack and placed   Skin:     General: Skin is warm and dry.   Neurological:      Mental Status: He is alert and oriented to person, place, and time.      Cranial Nerves: No cranial nerve deficit.         Results Review:  Results from last 7 days   Lab Units 10/04/22  0808   WBC 10*3/mm3 5.71   HEMOGLOBIN g/dL 13.8   HEMATOCRIT % 43.1   PLATELETS 10*3/mm3 187     Results from last 7 days   Lab Units 10/04/22  0808   SODIUM mmol/L 139   POTASSIUM mmol/L 4.3   CHLORIDE mmol/L 103   CO2 mmol/L 26.2   BUN mg/dL 12   CREATININE mg/dL 0.91   CALCIUM mg/dL 9.4   GLUCOSE mg/dL 150*       I reviewed the patient's new clinical results.        Primary osteoarthritis of left knee    Effusion, left knee    HTN  (hypertension)    DM (diabetes mellitus), type 2 (HCC)      Assessment & Plan    Patient appears medically stable at this time.  Blood pressure and blood sugars are reasonably controlled.  We will continue his home antihypertensive with holding parameters in place.  Hold oral diabetic medications and initiate sliding scale for now.  We will check a BMP in the morning.  His Effient and aspirin will be restarted tomorrow morning as well.  He is doing very well at this time.  We will follow-up with him tomorrow.    I discussed the patients findings and my recommendations with patient and family    Thank you for allowing me to participate in the care of your patient.    I wore full protective equipment throughout the patient encounter including eye protection and facemask.  Hand hygiene was performed before donning protective equipment and after removal when leaving the room.    Sandeep Stevens MD  10/07/22  13:13 EDT

## 2022-10-07 NOTE — ANESTHESIA POSTPROCEDURE EVALUATION
Patient: Quentin Hart    Procedure Summary     Date: 10/07/22 Room / Location:  [facility] [address] [place] [address] [facility]    Anesthesia Start: 0733 Anesthesia Stop: 0941    Procedure: TOTAL KNEE ARTHROPLASTY WITH PAO NAVIGATION (Left Knee) Diagnosis:       Primary osteoarthritis of left knee      (Primary osteoarthritis of left knee [M17.12])    Surgeons: Cholo Mckeon MD Provider: Orlando Wisdom MD    Anesthesia Type: general with block ASA Status: 3          Anesthesia Type: general with block    Vitals  Vitals Value Taken Time   /74 10/07/22 1030   Temp 36.8 °C (98.3 °F) 10/07/22 0940   Pulse 62 10/07/22 1040   Resp 16 10/07/22 1030   SpO2 96 % 10/07/22 1040   Vitals shown include unvalidated device data.        Post Anesthesia Care and Evaluation    Patient location during evaluation: PACU  Patient participation: complete - patient participated  Level of consciousness: awake  Pain management: satisfactory to patient    Airway patency: patent  Anesthetic complications: No anesthetic complications  PONV Status: controlled  Cardiovascular status: acceptable  Respiratory status: acceptable  Hydration status: acceptable

## 2022-10-07 NOTE — H&P
History & Physical       Patient: Quentin Hart    Date of Admission: 10/7/2022  5:03 AM    YOB: 1955    Medical Record Number: [id number]    Attending Physician: Cholo Mckeon MD        Chief Complaints: Primary osteoarthritis of left knee [M17.12]  Effusion, left knee [M25.462]      History of Present Illness: 67 y.o. male presents with Primary osteoarthritis of left knee [M17.12]  Effusion, left knee [M25.462]. Onset of symptoms was gradual and slowly progressive over the past 2 years.  Symptoms are associated with pain on the medial aspect of the left knee.  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  Symptoms are aggravated by deep flexion of the knee.   Symptoms improve with using a supportive brace and anti-inflammatory medication. Patient is now being admitted to the services of Cholo Mckeon MD for further evaluation and treatment.        Allergies   Allergen Reactions   • Morphine Nausea And Vomiting   • Adenosine Other (See Comments)     Prolonged heart block with administration during FFR//Cath         Home Medications:  Medications Prior to Admission   Medication Sig Dispense Refill Last Dose   • amLODIPine-benazepril (LOTREL) 10-40 MG per capsule Take 1 capsule by mouth Daily.   10/6/2022   • famotidine (PEPCID) 20 MG tablet Take 20 mg by mouth Daily. PT UNSURE IF TAKING   Past Month at Unknown time   • glimepiride (AMARYL) 1 MG tablet Take 1 mg by mouth Every Morning Before Breakfast.   10/7/2022   • meclizine (ANTIVERT) 25 MG tablet Take 25 mg by mouth 3 (Three) Times a Day As Needed.   Past Week at Unknown time   • metFORMIN (GLUCOPHAGE) 500 MG tablet Take 500 mg by mouth 2 (Two) Times a Day With Meals.   10/6/2022   • mupirocin (BACTROBAN) 2 % nasal ointment into the nostril(s) as directed by provider. As directed pre op   10/7/2022   • pantoprazole (PROTONIX) 40 MG EC tablet Take 40 mg by mouth Every  Morning.   10/7/2022   • ASPIRIN 81 PO Take 81 mg by mouth Daily. PT ADVISED TO CONTACT SURGEON REGARDING HOLDING PRIOR TO SURGERY   9/30/2022   • Chlorhexidine Gluconate 2 % liquid Apply  topically. As directed pre op   9/30/2022   • Cholecalciferol (VITAMIN D) 1000 units tablet Take 5,000 Units by mouth Daily.   9/30/2022   • Omega-3 Fatty Acids (FISH OIL) 1000 MG capsule capsule Take 1,000 mg by mouth Daily With Breakfast. PT HOLDING FOR SURGERY   9/30/2022   • prasugrel (EFFIENT) 10 MG tablet Take 10 mg by mouth Daily. PT HOLDING PRIOR TO SURGERY PER MD INSTRUCTIONS   9/30/2022       Current Medications:  Scheduled Meds:ceFAZolin, 2 g, Intravenous, Once  sodium chloride, 3 mL, Intravenous, Q12H      Continuous Infusions:lactated ringers, 9 mL/hr, Last Rate: 9 mL/hr (10/07/22 0639)      PRN Meds:.fentanyl  •  lidocaine PF 1%  •  midazolam  •  sodium chloride       Past Medical History:   Diagnosis Date   • Coronary artery disease    • Diabetes mellitus (HCC)    • Dizziness    • GERD (gastroesophageal reflux disease)    • History of 2019 novel coronavirus disease (COVID-19) 01/2022    AND 8/2021   • History of brain concussion    • History of recent fall    • [place] (hard of hearing)    • Hypertension    • Knee pain    • Neck fracture (HCC)     X2   • Obstructive sleep apnea     NO LONGER USING CPAP DUE TO RECALL   • Osteoarthritis    • PONV (postoperative nausea and vomiting)    • Prediabetes    • SOB (shortness of breath) on exertion         Past Surgical History:   Procedure Laterality Date   • COLONOSCOPY     • CORONARY ANGIOPLASTY WITH STENT PLACEMENT     • THUMB ARTHROSCOPY     • TONSILLECTOMY     • TOTAL KNEE ARTHROPLASTY Right 1996    DR MALDONADO        Social History     Occupational History   • Not on file   Tobacco Use   • Smoking status: Former Smoker   • Smokeless tobacco: Never Used   Substance and Sexual Activity   • Alcohol use: Not Currently   • Drug use: Never   • Sexual activity: Defer      Social  History     Social History Narrative   • Not on file        Family History   Problem Relation Age of Onset   • Malig Hyperthermia Neg Hx          Review of Systems:   HEENT: Patient denies any headaches, vision changes, change in hearing, or tinnitus, Patient denies any rhinorrhea,epistaxis, sinus pain, mouth or dental problems, sore throat or hoarseness, or dysphagia  Pulmonary: Patient denies any cough, congestion, SOA, or wheezing  Cardiovascular: Patient denies any chest pain, dyspnea, palpitations, weakness, intolerance of exercise, varicosities, swelling of extremities, known murmur  Gastrointestinal:  Patient denies nausea, vomiting, diarrhea, constipation, loss  of appetite, change in appetite, dysphagia, gas, heartburn, melena, change in bowel habits, use of laxatives or other drugs to alter the function of the gastrointestinal tract.  Genital/Urinary: Patient denies dysuria, change in color of urine, change in frequency of urination, pain with urgency, incontinence, retention, or nocturia.  Musculoskeletal: Patient denies increased warmth; redness; or swelling of joints; limitation of function; deformity; crepitation: pain in a joint or an extremity, the neck, or the back, especially with movement.  Neurological: Patient denies dizziness, tremor, ataxia, difficulty in speaking, change in speech, paresthesia, loss of sensation, seizures, syncope, changes in memory.  Endocrine system: Patient denies tremors, palpitations, intolerance of heat or cold, polyuria, polydipsia, polyphagia, diaphoresis, exophthalmos, or goiter.  Psychological: Patient denies thoughts/plans or harming self or other; depression,  insomnia, night terrors, elijah, memory loss, disorientation.  Skin: Patient denies any bruising, rashes, discoloration, pruritus, wounds, ulcers, decubiti, changes in the hair or nails  Hematopoietic: Patient denies history of spontaneous or excessive bleeding, epistaxis, hematuria, melena, fatigue, enlarged  or tender lymph nodes, pallor, history of anemia.    Physical Exam: 67 y.o. male  Vitals:    10/07/22 0549   BP: 149/85   BP Location: Left arm   Patient Position: Lying   Pulse: 68   Resp: 18   Temp: 97.6 °F (36.4 °C)   TempSrc: Oral   SpO2: 95%       General Appearance:          Alert, cooperative, in no acute distress                                                 Head:    Normocephalic, without obvious abnormality, atraumatic   Eyes:            Lids and lashes normal, conjunctivae and sclerae normal, no   icterus, no pallor, corneas clear, PERRLA   Ears:    Ears appear intact with no abnormalities noted   Throat:   No oral lesions, no thrush, oral mucosa moist   Neck:   No adenopathy, supple, trachea midline, no thyromegaly, no   carotid bruit, no JVD   Back:     No kyphosis present, no scoliosis present, no skin lesions,      erythema or scars, no tenderness to percussion or                   palpation,   range of motion normal   Lungs:     Clear to auscultation,respirations regular, even and                  unlabored    Heart:    Regular rhythm and normal rate, normal S1 and S2, no            murmur, no gallop, no rub, no click   Chest Wall:    No abnormalities observed   Abdomen:     Normal bowel sounds, no masses, no organomegaly, soft        nontender, nondistended, no guarding, no rebound                tenderness   Rectal:     Deferred   Extremities:   Tenderness over medial aspect of the left knee. Moves all extremities well, no edema,   no cyanosis, no redness   Pulses:   Pulses palpable and equal bilaterally   Skin:   No bleeding, bruising or rash   Lymph nodes:   No palpable adenopathy   Neurologic:   Cranial nerves 2 - 12 grossly intact, sensation intact, DTR       present and equal bilaterally   Left knee. Patient has crepitus throughout range of motion. Positive patellar grind test. Mild effusion. Lachman is negative. Pivot shift is negative. Anterior and posterior drawer signs are negative.  Significant joint line tenderness is noted on the medial aspect of the knee. Patient has a varus orientation of the knee. There is fullness and tenderness in the popliteal fossa. Mild distention of a popliteal cyst is noted in this location. Range of motion in flexion is from 0-100 degrees. Neurovascular status is intact.  Dorsalis pedis and posterior tibial artery pulses are palpable. Common peroneal nerve function is well preserved. Patient's gait is cautious and antalgic. Skin and soft tissues are mildly swollen, consistent with synovitis and effusion. The patient has a significant limp with the first few steps after starting the gait cycle. Getting out of a chair takes a lot of effort due to pain on knee flexion.        Diagnostic Tests:  Admission on 10/07/2022   Component Date Value Ref Range Status   • Glucose 10/07/2022 117  70 - 130 mg/dL Final    Meter: [id number] : [id number] Hussein Arguello RN     No results found.      Assessment:  Patient Active Problem List   Diagnosis   • Status post total knee replacement, right   • Knee effusion, right   • Pain in both knees   • Effusion, left knee   • Primary osteoarthritis of left knee         Plan:  The patient voiced understanding of the risks, benefits, and alternative forms of treatment that were discussed and the patient consents to proceed with left total knee arthroplasty with navigation instrumentation possible Lety robotic arm usage.     The patient was seen today for preoperative discussion.  The patient has been tried on over-the-counter and prescription NSAID's despite the risks of anti-inflammatory bleeding, peptic ulcers and erosive gastritis with short term benefit only.  Braces have been prescribed for mechanical support.  Patient has been participating in an exercise program specifically targeting joint pain relief with limited benefit. Intraarticular injections have been used periodically with some but not complete relief of pain.   Ambulation aids have also been utilized.      The details of the surgical procedure were explained including the location of probable incisions and a description of the likely hardware/grafts to be used. The patient understands the likely convalescence after surgery as well as the rehabilitation required.  Also, we have thoroughly discussed with the patient the risks, benefits and alternatives to surgery.  Risks include but are not limited to the risk of infection, joint stiffness, limited range of motion, wound healing problems, scar tissue build up, myocardial infarction, stroke, blood clots (including DVT and/or pulmonary embolus along with the risk of death) neurologic and/or vascular injury, limb length discrepancy, fracture, dislocation, nonunion, malunion, continued pain and need for further surgery including hardware failure requiring revision.   Discharge Plan: today to home and home health      Date: 10/7/2022    Cholo Mckeon MD      DICTATED [employer]ILIZING [employer]AG[employer] DICT[employer]

## 2022-10-07 NOTE — PLAN OF CARE
Goal Outcome Evaluation:      VSS- Pt alert and oriented. POD 0 of a total left knee. Dressing C/D/I. Neurovascular checks wdl. Pt eating, drinking and voiding well. Pt ambulating without difficulty. Pt to discharge home tomorrow. Pain well controlled.

## 2022-10-07 NOTE — THERAPY EVALUATION
Patient Name: Quentin Hart  : 1955    MRN: [id number]                              Today's Date: 10/7/2022       Admit Date: 10/7/2022    Visit Dx:     ICD-10-CM ICD-9-CM   1. Primary osteoarthritis of left knee  M17.12 715.16     Patient Active Problem List   Diagnosis   • Status post total knee replacement, right   • Knee effusion, right   • Pain in both knees   • Effusion, left knee   • Primary osteoarthritis of left knee   • HTN (hypertension)   • DM (diabetes mellitus), type 2 (HCC)     Past Medical History:   Diagnosis Date   • Coronary artery disease    • Diabetes mellitus (HCC)    • Dizziness    • GERD (gastroesophageal reflux disease)    • History of 2019 novel coronavirus disease (COVID-19) 2022    AND 2021   • History of brain concussion    • History of recent fall    • [place] (hard of hearing)    • Hypertension    • Knee pain    • Neck fracture (HCC)     X2   • Obstructive sleep apnea     NO LONGER USING CPAP DUE TO RECALL   • Osteoarthritis    • PONV (postoperative nausea and vomiting)    • Prediabetes    • SOB (shortness of breath) on exertion      Past Surgical History:   Procedure Laterality Date   • COLONOSCOPY     • CORONARY ANGIOPLASTY WITH STENT PLACEMENT     • THUMB ARTHROSCOPY     • TONSILLECTOMY     • TOTAL KNEE ARTHROPLASTY Right     DR MALDONADO      General Information     Row Name 10/07/22 0428          Physical Therapy Time and Intention    Document Type evaluation  -     Mode of Treatment individual therapy;physical therapy  -     Row Name 10/07/22 [facility]7          General Information    Patient Profile Reviewed yes  -     Prior Level of Function independent:;gait;transfer;bed mobility  -     Existing Precautions/Restrictions fall  -     Barriers to Rehab none identified  -     Row Name 10/07/22 1359          Living Environment    People in Home spouse  -     Row Name 10/07/22 [facility]6          Home Main Entrance    Number of Stairs, Main Entrance one  -     Row  Name 10/07/22 1359          Stairs Within Home, Primary    Number of Stairs, Within Home, Primary none  -     Row Name 10/07/22 1355          Cognition    Orientation Status (Cognition) oriented x 4  -     Row Name 10/07/22 1356          Safety Issues, Functional Mobility    Impairments Affecting Function (Mobility) balance;endurance/activity tolerance;strength;pain;range of motion (ROM)  -           User Key  (r) = Recorded By, (t) = Taken By, (c) = Cosigned By    Initials Name Provider Type     Sandra Parry PT Physical Therapist               Mobility     Row Name 10/07/22 135          Bed Mobility    Comment, (Bed Mobility) NT - standing in bathroom on arrival and UIC at end of session  -     Row Name 10/07/22 1354          Sit-Stand Transfer    Sit-Stand Gary (Transfers) standby assist;verbal cues  -     Assistive Device (Sit-Stand Transfers) walker, 4-wheeled  -     Row Name 10/07/22 1352          Gait/Stairs (Locomotion)    [place] Level (Gait) contact guard;standby assist;verbal cues  -     Assistive Device (Gait) walker, front-wheeled  -     Distance in Feet (Gait) 60ft  -     Deviations/Abnormal Patterns (Gait) antalgic;sohan decreased;gait speed decreased;stride length decreased  -     Bilateral Gait Deviations forward flexed posture  -     Left Sided Gait Deviations weight shift ability decreased  -     Row Name 10/07/22 1354          Mobility    Extremity Weight-bearing Status left lower extremity  -     Left Lower Extremity (Weight-bearing Status) weight-bearing as tolerated (WBAT)  -           User Key  (r) = Recorded By, (t) = Taken By, (c) = Cosigned By    Initials Name Provider Type     Sandra Parry PT Physical Therapist               Obj/Interventions     Row Name 10/07/22 1400          Range of Motion Comprehensive    General Range of Motion lower extremity range of motion deficits identified  -     Comment, General Range of Motion  Expected post-op ROM deficits, L knee flexion grossly 80 degrees with heel slides  -     Row Name 10/07/22 1400          Strength Comprehensive (MMT)    General Manual Muscle Testing (MMT) Assessment lower extremity strength deficits identified  -     Comment, General Manual Muscle Testing (MMT) Assessment Expected post-op strength deficits, BLE grossly 4/5  -     Row Name 10/07/22 1400          Motor Skills    Therapeutic Exercise --  5 reps TKA protocol  -     Row Name 10/07/22 1400          Balance    Balance Assessment sitting static balance;sitting dynamic balance;standing static balance;standing dynamic balance  -     Static Sitting Balance modified independence  -     Dynamic Sitting Balance modified independence  -     Position, Sitting Balance unsupported;sitting edge of bed  -     Static Standing Balance standby assist;verbal cues  -     Dynamic Standing Balance contact guard;verbal cues  -     Position/Device Used, Standing Balance walker, front-wheeled;supported  -     Balance Interventions sitting;standing;sit to stand;supported;static;dynamic  -[facility] Name 10/07/22 1400          Sensory Assessment (Somatosensory)    Sensory Assessment (Somatosensory) LE sensation intact  -           User Key  (r) = Recorded By, (t) = Taken By, (c) = Cosigned By    Initials Name Provider Type     Sandra Parry L, PT Physical Therapist               Goals/Plan     [facility] Name 10/07/22 1409          Bed Mobility Goal 1 (PT)    Activity/Assistive Device (Bed Mobility Goal 1, PT) bed mobility activities, all  -     [place] Level/Cues Needed (Bed Mobility Goal 1, PT) modified [facility]     Time Frame (Bed Mobility Goal 1, PT) 3 days  -     Row Name 10/07/22 1403          Transfer Goal 1 (PT)    Activity/Assistive Device (Transfer Goal 1, PT) transfers, all  -     [place] Level/Cues Needed (Transfer Goal 1, PT) modified [facility]     Time Frame (Transfer Goal 1, PT)  3 days  -     Row Name 10/07/22 1408          Gait Training Goal 1 (PT)    Activity/Assistive Device (Gait Training Goal 1, PT) gait (walking locomotion)  -     [place] Level (Gait Training Goal 1, PT) modified independence  -     Distance (Gait Training Goal 1, PT) 150ft  -     Time Frame (Gait Training Goal 1, PT) 3 days  -     Row Name 10/07/22 1408          Stairs Goal 1 (PT)    Activity/Assistive Device (Stairs Goal 1, PT) stairs, all skills  -     [place] Level/Cues Needed (Stairs Goal 1, PT) contact guard required  -     Number of Stairs (Stairs Goal 1, PT) 1  -     Time Frame (Stairs Goal 1, PT) 3 days  -     Row Name 10/07/22 1408          Therapy Assessment/Plan (PT)    Planned Therapy Interventions (PT) balance training;bed mobility training;gait training;home exercise program;patient/family education;strengthening;stair training;ROM (range of motion);transfer training  -           User Key  (r) = Recorded By, (t) = Taken By, (c) = Cosigned By    Initials Name Provider Type     Sandra Parry, PT Physical Therapist               Clinical Impression     Row Name 10/07/22 1402          Pain    Pretreatment Pain Rating 0/10 - no pain  -[facility] Name 10/07/22 1402          Plan of Care Review    Plan of Care Reviewed With patient;spouse  -     Outcome Evaluation Pt is a 68 yo M POD 0 L TKA. Pt lives with his wife and reports independence at BL with no AD, but has a rwx. Pt has 1 RENÉ with no steps inside and reports 2-3 recent falls in the last 6 months. Pt presents to PT with impaired strength, endurance, and pain limiting overall mobility. Pt standing in bathroom with wife on arrival and ambulated 60ft in hallway with rwx and CGA. Pt cued for upright posture and to take his time, especially with turns. Pt returned to room and agreeable to sitting [facility]. Completed TKA exercises with good ROM/strength and educated about icing and elevation. PT will continue to follow to  progress mobility and improve overall independence. Anticipate D/C home with HHPT.  -     Row Name 10/07/22 1402          Therapy Assessment/Plan (PT)    Patient/Family Therapy Goals Statement (PT) Return to PLOF  -     Rehab Potential (PT) good, to achieve stated therapy goals  -     Criteria for Skilled Interventions Met (PT) yes  -     Therapy Frequency (PT) daily  -     Row Name 10/07/22 1402          Vital Signs    O2 Delivery Pre Treatment room air  -     O2 Delivery Intra Treatment room air  -     O2 Delivery Post Treatment room air  -     Row Name 10/07/22 1402          Positioning and Restraints    Pre-Treatment Position standing in room  -     Post Treatment Position chair  -     In Chair reclined;call light within reach;encouraged to call for assist;with family/caregiver;notified [facility] [place] [place]  -           User Key  (r) = Recorded By, (t) = Taken By, (c) = Cosigned By    Initials Name Provider Type     Sandra Parry, PT Physical Therapist               Outcome Measures     Row Name 10/07/22 1409 10/07/22 1115       How much help from another person do you currently need...    Turning from your back to your side while in flat bed without using bedrails? 4  - 4  -JF    Moving from lying on back to sitting on the side of a flat bed without bedrails? 4  - 4  -JF    Moving to and from a bed to a chair (including a wheelchair)? 4  - 4  -JF    Standing up from a chair using your arms (e.g., wheelchair, bedside chair)? 4  - 4  -    Climbing 3-5 steps with a railing? 3  - 3  -    To walk in hospital room? 3  - 4  -    AM-PAC 6 Clicks Score (PT) 22  - 23  -    Highest level of mobility 7 --> Walked 25 feet or more  - 7 --> Walked 25 feet or more  -    Row Name 10/07/22 1409          Functional Assessment    Outcome Measure Options AM-PAC 6 Clicks Basic Mobility (PT)  -           User Key  (r) = Recorded By, (t) = Taken By, (c) = Cosigned By    Initials Name Provider Type      Sandra Parry, PT Physical Therapist    Yesi Silva RN Registered Nurse                             Physical Therapy Education                 Title: PT OT SLP Therapies (Done)     Topic: Physical Therapy (Done)     Point: Mobility training (Done)     Learning Progress Summary           Patient Acceptance, E,TB,D, VU,NR by  at 10/7/2022 1409                   Point: Home exercise program (Done)     Learning Progress Summary           Patient Acceptance, E,TB,D, VU,NR by  at 10/7/2022 1409                   Point: Body mechanics (Done)     Learning Progress Summary           Patient Acceptance, E,TB,D, VU,NR by  at 10/7/2022 1409                   Point: Precautions (Done)     Learning Progress Summary           Patient Acceptance, E,TB,D, VU,NR by  at 10/7/2022 1409                               User Key     Initials Effective Dates Name Provider Type Discipline     04/08/22 -  Sandra Parry PT Physical Therapist PT              PT Recommendation and Plan  Planned Therapy Interventions (PT): balance training, bed mobility training, gait training, home exercise program, patient/family education, strengthening, stair training, ROM (range of motion), transfer training  Plan of Care Reviewed With: patient, spouse  Outcome Evaluation: Pt is a 66 yo M POD 0 L TKA. Pt lives with his wife and reports independence at BL with no AD, but has a rwx. Pt has 1 RENÉ with no steps inside and reports 2-3 recent falls in the last 6 months. Pt presents to PT with impaired strength, endurance, and pain limiting overall mobility. Pt standing in bathroom with wife on arrival and ambulated 60ft in hallway with rwx and CGA. Pt cued for upright posture and to take his time, especially with turns. Pt returned to room and agreeable to sitting UIC. Completed TKA exercises with good ROM/strength and educated about icing and elevation. PT will continue to follow to progress mobility and improve overall independence.  Anticipate D/C home with HHPT.     Time Calculation:    PT Charges     Row Name 10/07/22 1410             Time Calculation    Start Time 1139  -      Stop Time 1150  -      Time Calculation (min) 11 min  -      PT Received On 10/07/22  -      PT - Next Appointment 10/08/22  -      PT Goal Re-Cert Due Date 10/10/22  -              Time Calculation- PT    Total Timed Code Minutes- PT 8 minute(s)  -              Timed Charges    22411 - Gait Training Minutes  8  -              Untimed Charges    PT Eval/Re-eval Minutes 5  -              Total Minutes    Timed Charges Total Minutes 8  -      Untimed Charges Total Minutes 5  -       Total Minutes 13  -            User Key  (r) = Recorded By, (t) = Taken By, (c) = Cosigned By    Initials Name Provider Type     Sandra Parry, PT Physical Therapist              Therapy Charges for Today     Code Description Service Date Service Provider Modifiers Qty    14331156352 HC GAIT TRAINING EA 15 MIN 10/7/2022 Sandra Parry, PT GP 1    82461135475 HC PT EVAL LOW COMPLEXITY 2 10/7/2022 Sandra Parry, PT GP 1          PT G-Codes  Outcome Measure Options: AM-PAC 6 Clicks Basic Mobility (PT)  AM-PAC 6 Clicks Score (PT): 22    Sandra Parry PT  10/7/2022

## 2022-10-07 NOTE — PLAN OF CARE
Goal Outcome Evaluation:  Plan of Care Reviewed With: patient, spouse           Outcome Evaluation: Pt is a 66 yo M POD 0 L TKA. Pt lives with his wife and reports independence at BL with no AD, but has a rwx. Pt has 1 RENÉ with no steps inside and reports 2-3 recent falls in the last 6 months. Pt presents to PT with impaired strength, endurance, and pain limiting overall mobility. Pt standing in bathroom with wife on arrival and ambulated 60ft in hallway with rwx and CGA. Pt cued for upright posture and to take his time, especially with turns. Pt returned to room and agreeable to sitting UIC. Completed TKA exercises with good ROM/strength and educated about icing and elevation. PT will continue to follow to progress mobility and improve overall independence. Anticipate D/C home with HHPT.

## 2022-10-07 NOTE — OP NOTE
TOTAL KNEE ARTHROPLASTY OPERATIVE     PATIENT NAME:Quentin Hart     YOB: 1955     ATTENDING PHYSICIAN: Cholo Mckeon MD     DATE OF PROCEDURE: 10/7/2022     PREOPERATIVE DIAGNOSIS: Severe degenerative osteoarthritis, left knee.    POSTOPERATIVE DIAGNOSIS: Post-Op Diagnosis Codes:     * Primary osteoarthritis of left knee [M17.12]    PRINCIPAL DIAGNOSIS: Severe degenerative osteoarthritis left knee with a varus deformity.    PROCEDURE: Left total knee arthroplasty.    SURGEON:  Cholo Mckeon M.D.    ASSISTANT: Bri Clark first assistant was responsible for performing the following activities: Retraction, Suction, Irrigation, Suturing, Closing and Placing Dressing and their skilled assistance was necessary for the success of this case.       ANESTHESIOLOGIST: Dr. Erik MD    ANESTHESIA: Adductor canal block for postop pain control followed by general anesthesia.    POSITION: Supine on the operating table.    DRAINS: None.    COMPLICATIONS: None.    ESTIMATED BLOOD LOSS: 100ml    IMPLANT USED: Doris Persona total knee replacement system, #10 standard posterior cruciate retaining femoral component, number F tibia with a 30 mm intramedullary stem, 10 mm thick MC, medially constrained Vitamin E impregnated polyethylene insert, #38 patella anchored into position using Refobicin antibiotic impregnated bone cement.     SPECIMENS: * No orders in the log *        INDICATION: The patient has been having very significant pain and discomfort in the knee.  We had scheduled the patient for total knee replacement after all forms of conservative nonoperative care had failed to provide adequate relief of symptoms.  Patient understood all the risks and benefits which were discussed in great detail including the possibility of death, infection, myocardial infarction, DVT, pulmonary embolism, stiffness of the knee, wound infection and breakdown, possibility of revision surgery, neurovascular compromise, pneumonia,  pulmonary embolism      DETAILS OF PROCEDURE: Surgical timeout was called. Operative extremity was correctly identified in the operating room suite. Patient was placed under appropriate anesthetic.  Patient was administered IV antibiotics per SCIP protocol and hospital policy. The patient’s operative extremity was correctly identified in the operating room suite.A Tourniquet was applied after the leg has been exsanguinated. The correctly identified extremity was prepped and draped in a standard fashion.      An anterior approach was performed and a quad sparing, subvastus approach was carried out. The patellofemoral ligament was resected. Medial soft tissue release was carried out on the medial face of the tibia to balance the soft tissues and to release the contracture of the knee MCL. Tibial Osteophytes were resected. Severe bone-on-bone appearance was noted.  A thorough synovectomy was carried out. The Synovium was thickened and hypertrophic. The PCL and MCL were carefully protected throughout the entire procedure. The End [email or web address] Navigation System was brought into the operating room and the distal femur was mapped. A 10 mm thick cut was made off the distal femur. A measuring guide was used and #10 standard posterior cruciate retaining femoral component jig was found to be the best fit. Anterior, posterior and chamfer cuts were created. Care was taken to prevent creating a distal femoral notch. The proximal tibia was then mapped with navigation. 4 mm of the bone was resected off the medial tibial plateau.  An appropriate tibial slope was built into the cut to match the normal anatomy.  A number F tibia was found to be the best fit resting nicely on the cortical margins of the resected metaphysis of the proximal tibia.  A trial reduction was carried out. Rotation and orientation of the components were carefully marked. Flexion and extension gaps were checked and found to be symmetric. The stability of  the components was checked in full extension, mid- flexion and full flexion.The patella was everted, it was measured and cut.  A #38 patella was found to be the best fit resting nicely on the host bone.  Patellar Tracking was excellent. A Lateral release was not deemed necessary. Femoral lug holes were drilled. The Proximal Tibia was die punched. Patellar anchor holes were drilled.  The posterior capsular structures and the subperiosteal ligamentous insertions were infiltrated with Exparel as a local anesthetic.    The cancellous bone was lavaged with a Pulse lavage  and dried.  We also used diluted Betadine as an antiseptic rinse solution.  Cement was mixed on the back table. Components were cemented into position sequentially, starting with the number F left tibial component and going to the #10 standard posterior cruciate retaining femur and then the #38 patella. The excess amounts of bone cement were removed from the bone-metal interface. Trial reduction was carried out once the cement was fully cured. A 10 mm tibial polyethylene insert, MC medially constrained design insert, was then locked into position on the tibial tray. Wound was lavaged with antibiotic containing irrigating solution. Tourniquet was deflated, hemostasis achieved. An analgesic cocktail was injected intra-articularly into the joint capsule and ligamentous insertions on bone for post op pain relief. The arthrotomy was repaired in 60 degrees of flexion. Subcutaneous sutures were applied. Occlusive dressing was applied. No complications were encountered. Sponge count and needle count were correct. The patient was reversed from anesthesia and taken from the operating room to the recovery room in stable condition. I discussed the satisfactory performance of this procedure with the patient’s family and answered all questions for them.       Cholo Mckeon MD  10/7/2022  07:39 EDT

## 2022-10-07 NOTE — ANESTHESIA PROCEDURE NOTES
Airway  Urgency: elective    Date/Time: 10/7/2022 7:44 AM  Airway not difficult    General Information and Staff    Patient location during procedure: OR  Anesthesiologist: Orlando Wisdom MD  CRNA/CAA: Nan Aleman CRNA    Indications and Patient Condition  Indications for airway management: airway protection    Preoxygenated: yes  MILS not maintained throughout  Mask difficulty assessment: 1 - vent by mask    Final Airway Details  Final airway type: endotracheal airway      Successful airway: ETT  Cuffed: yes   Successful intubation technique: direct laryngoscopy  Endotracheal tube insertion site: oral  Blade: Latanya  Blade size: 4  ETT size (mm): 7.5  Cormack-Lehane Classification: grade IIa - partial view of glottis  Placement verified by: chest auscultation and capnometry   Measured from: lips  ETT/EBT  to lips (cm): 22  Number of attempts at approach: 1  Assessment: lips, teeth, and gum same as pre-op and atraumatic intubation    Additional Comments  PreO2 100%, FEO2 >85, SIVI, easy BMV, sniff position, ETT placed/ confirmed, attraumatic, teeth and lips as preop.

## 2022-10-07 NOTE — ANESTHESIA PROCEDURE NOTES
Peripheral Block      Patient reassessed immediately prior to procedure    Patient location during procedure: pre-op  Stop time: 10/7/2022 7:06 AM  Reason for block: procedure for pain, at surgeon's request and post-op pain management  Performed by  Anesthesiologist: Chris Almazan MD  Preanesthetic Checklist  Completed: patient identified, IV checked, site marked, risks and benefits discussed, surgical consent, monitors and equipment checked, pre-op evaluation and timeout performed  Prep:  Pt Position: supine  Sterile barriers:cap, gloves, sterile barriers and mask  Prep: ChloraPrep  Patient monitoring: blood pressure monitoring, continuous pulse oximetry and EKG  Procedure    Sedation: yes  Performed under: local infiltration  Guidance:ultrasound guided    ULTRASOUND INTERPRETATION.  Using ultrasound guidance a 21 G gauge needle was placed in close proximity to the femoral nerve, at which point, under ultrasound guidance anesthetic was injected in the area of the nerve and spread of the anesthesia was seen on ultrasound in close proximity thereto.  There were no abnormalities seen on ultrasound; a digital image was taken; and the patient tolerated the procedure with no complications. Images:still images obtained, printed/placed on chart    Laterality:left  Block Type:adductor canal block  Injection Technique:single-shot  Needle Type:echogenic  Needle Gauge:21 G  Resistance on Injection: none    Medications Used: ropivacaine (NAROPIN) 0.5 % injection, 27 mL  ropivacaine (NAROPIN) 0.2 % injection, 13 mL  Med administered at 10/7/2022 7:06 AM      Post Assessment  Injection Assessment: negative aspiration for heme, no paresthesia on injection and incremental injection  Patient Tolerance:comfortable throughout block  Complications:no  Additional Notes  Ultrasound guidance was used for needle placement and to view and verify medication disbursement.

## 2022-10-08 VITALS
WEIGHT: 246.91 LBS | OXYGEN SATURATION: 95 % | TEMPERATURE: 98.7 F | BODY MASS INDEX: 33.44 KG/M2 | SYSTOLIC BLOOD PRESSURE: 113 MMHG | HEART RATE: 75 BPM | DIASTOLIC BLOOD PRESSURE: 66 MMHG | HEIGHT: 72 IN | RESPIRATION RATE: 16 BRPM

## 2022-10-08 LAB
ANION GAP SERPL CALCULATED.3IONS-SCNC: 9.6 MMOL/L (ref 5–15)
BUN SERPL-MCNC: 17 MG/DL (ref 8–23)
BUN/CREAT SERPL: 15 (ref 7–25)
CALCIUM SPEC-SCNC: 9 MG/DL (ref 8.6–10.5)
CHLORIDE SERPL-SCNC: 99 MMOL/L (ref 98–107)
CO2 SERPL-SCNC: 24.4 MMOL/L (ref 22–29)
CREAT SERPL-MCNC: 1.13 MG/DL (ref 0.76–1.27)
EGFRCR SERPLBLD CKD-EPI 2021: 70.4 ML/MIN/1.73
GLUCOSE BLDC GLUCOMTR-MCNC: 245 MG/DL (ref 70–130)
GLUCOSE SERPL-MCNC: 214 MG/DL (ref 65–99)
HCT VFR BLD AUTO: 36.1 % (ref 37.5–51)
HGB BLD-MCNC: 12.2 G/DL (ref 13–17.7)
POTASSIUM SERPL-SCNC: 4.8 MMOL/L (ref 3.5–5.2)
SODIUM SERPL-SCNC: 133 MMOL/L (ref 136–145)

## 2022-10-08 PROCEDURE — 85014 HEMATOCRIT: CPT | Performed by: ORTHOPAEDIC SURGERY

## 2022-10-08 PROCEDURE — G0378 HOSPITAL OBSERVATION PER HR: HCPCS

## 2022-10-08 PROCEDURE — 80048 BASIC METABOLIC PNL TOTAL CA: CPT | Performed by: ORTHOPAEDIC SURGERY

## 2022-10-08 PROCEDURE — 82962 GLUCOSE BLOOD TEST: CPT

## 2022-10-08 PROCEDURE — 85018 HEMOGLOBIN: CPT | Performed by: ORTHOPAEDIC SURGERY

## 2022-10-08 PROCEDURE — 63710000001 INSULIN LISPRO (HUMAN) PER 5 UNITS: Performed by: INTERNAL MEDICINE

## 2022-10-08 PROCEDURE — 97530 THERAPEUTIC ACTIVITIES: CPT

## 2022-10-08 PROCEDURE — 99024 POSTOP FOLLOW-UP VISIT: CPT | Performed by: ORTHOPAEDIC SURGERY

## 2022-10-08 RX ADMIN — DOCUSATE SODIUM 100 MG: 100 CAPSULE, LIQUID FILLED ORAL at 08:13

## 2022-10-08 RX ADMIN — OXYCODONE HYDROCHLORIDE AND ACETAMINOPHEN 1 TABLET: 5; 325 TABLET ORAL at 05:00

## 2022-10-08 RX ADMIN — MUPIROCIN 1 APPLICATION: 20 OINTMENT TOPICAL at 08:13

## 2022-10-08 RX ADMIN — OXYCODONE HYDROCHLORIDE AND ACETAMINOPHEN 1 TABLET: 5; 325 TABLET ORAL at 00:44

## 2022-10-08 RX ADMIN — OXYCODONE HYDROCHLORIDE AND ACETAMINOPHEN 1 TABLET: 5; 325 TABLET ORAL at 09:11

## 2022-10-08 RX ADMIN — POLYETHYLENE GLYCOL 3350 17 G: 17 POWDER, FOR SOLUTION ORAL at 08:13

## 2022-10-08 RX ADMIN — ASPIRIN 81 MG: 81 TABLET, COATED ORAL at 08:13

## 2022-10-08 RX ADMIN — PRASUGREL 10 MG: 10 TABLET, FILM COATED ORAL at 08:13

## 2022-10-08 RX ADMIN — FAMOTIDINE 20 MG: 20 TABLET ORAL at 08:17

## 2022-10-08 RX ADMIN — INSULIN LISPRO 3 UNITS: 100 INJECTION, SOLUTION INTRAVENOUS; SUBCUTANEOUS at 08:13

## 2022-10-08 NOTE — PLAN OF CARE
Goal Outcome Evaluation:              Outcome Evaluation: Patient to be discharged to home with hh, stable condition, seen by PT, needs attended.

## 2022-10-08 NOTE — THERAPY PROGRESS REPORT/RE-CERT
Patient Name: Quentin Hart  : 1955    MRN: [id number]                              Today's Date: 10/8/2022       Admit Date: 10/7/2022    Visit Dx:     ICD-10-CM ICD-9-CM   1. Primary osteoarthritis of left knee  M17.12 715.16     Patient Active Problem List   Diagnosis   • Status post total knee replacement, right   • Knee effusion, right   • Pain in both knees   • Effusion, left knee   • Primary osteoarthritis of left knee   • HTN (hypertension)   • DM (diabetes mellitus), type 2 (HCC)     Past Medical History:   Diagnosis Date   • Coronary artery disease    • Diabetes mellitus (HCC)    • Dizziness    • GERD (gastroesophageal reflux disease)    • History of 2019 novel coronavirus disease (COVID-19) 2022    AND 2021   • History of brain concussion    • History of recent fall    • [place] (hard of hearing)    • Hypertension    • Knee pain    • Neck fracture (HCC)     X2   • Obstructive sleep apnea     NO LONGER USING CPAP DUE TO RECALL   • Osteoarthritis    • PONV (postoperative nausea and vomiting)    • Prediabetes    • SOB (shortness of breath) on exertion      Past Surgical History:   Procedure Laterality Date   • COLONOSCOPY     • CORONARY ANGIOPLASTY WITH STENT PLACEMENT     • THUMB ARTHROSCOPY     • TONSILLECTOMY     • TOTAL KNEE ARTHROPLASTY Right     DR MALDONADO      General Information     Row Name 10/08/22 1018          Physical Therapy Time and Intention    Document Type therapy note (daily note)  -CW     Mode of Treatment physical therapy  -CW     Row Name 10/08/22 1018          General Information    Patient Profile Reviewed yes  -CW     Existing Precautions/Restrictions fall  -CW     Row Name 10/08/22 1018          Cognition    Orientation Status (Cognition) oriented x 4  -CW     Row Name 10/08/22 1018          Safety Issues, Functional Mobility    Impairments Affecting Function (Mobility) balance;endurance/activity tolerance;strength;pain;range of motion (ROM)  -CW           User Key   (r) = Recorded By, (t) = Taken By, (c) = Cosigned By    Initials Name Provider Type    CW Bogdan Pena PTA Physical Therapist Assistant               Mobility     Row Name 10/08/22 1018          Sit-Stand Transfer    Sit-Stand [place] (Transfers) standby assist;verbal cues  -CW     Assistive Device (Sit-Stand Transfers) walker, 4-wheeled  -CW     Row Name 10/08/22 1018          Gait/Stairs (Locomotion)    [place] Level (Gait) contact guard;standby assist;verbal cues  -CW     Assistive Device (Gait) walker, front-wheeled  -CW     Distance in Feet (Gait) 250'  -CW     Deviations/Abnormal Patterns (Gait) antalgic  -CW     Assistive Device (Stairs) walker, front-wheeled  -CW     Handrail Location (Stairs) none  -CW     Number of Steps (Stairs) 1  -CW     Ascending Technique (Stairs) step-to-step  -CW     Descending Technique (Stairs) step-to-step  -CW     Row Name 10/08/22 1018          Mobility    Extremity Weight-bearing Status left lower extremity  -CW     Left Lower Extremity (Weight-bearing Status) weight-bearing as tolerated (WBAT)  -CW           User Key  (r) = Recorded By, (t) = Taken By, (c) = Cosigned By    Initials Name Provider Type    Bogdan Thomas PTA Physical Therapist Assistant               Obj/Interventions    No documentation.                Goals/Plan    No documentation.                Clinical Impression     Row Name 10/08/22 1019          Pain    Pretreatment Pain Rating 0/10 - no pain  -CW     Posttreatment Pain Rating 1/10  -CW     Pain Location - Side/Orientation Left  -CW     Pain Location - knee  -CW     Pain Intervention(s) Medication (See MAR);Repositioned;Ambulation/increased activity  -CW     Row Name 10/08/22 1019          Plan of Care Review    Plan of Care Reviewed With patient;spouse  -CW     Progress improving  -CW     Outcome Evaluation Pt increased with bed mobility, transfer, and amb safety and I with RWX to 250' and 1 step  -CW     Row Name 10/08/22  1019          Therapy Assessment/Plan (PT)    Rehab Potential (PT) good, to achieve stated therapy goals  -CW     Criteria for Skilled Interventions Met (PT) yes  -CW     Therapy Frequency (PT) daily  -CW     Row Name 10/08/22 1019          Positioning and Restraints    Pre-Treatment Position in bed  -CW     Post Treatment Position bed  -CW     In Bed notified nsg;sitting;call light within reach;encouraged to call for assist;with family/caregiver  -CW           User Key  (r) = Recorded By, (t) = Taken By, (c) = Cosigned By    Initials Name Provider Type    CW Bogdan Pena PTA Physical Therapist Assistant               Outcome Measures     Row Name 10/08/22 1020          How much help from another person do you currently need...    Turning from your back to your side while in flat bed without using bedrails? 4  -CW     Moving from lying on back to sitting on the side of a flat bed without bedrails? 4  -CW     Moving to and from a bed to a chair (including a wheelchair)? 4  -CW     Standing up from a chair using your arms (e.g., wheelchair, bedside chair)? 4  -CW     Climbing 3-5 steps with a railing? 4  -CW     To walk in hospital room? 4  -CW     AM-PAC 6 Clicks Score (PT) 24  -CW     Highest level of mobility 8 --> Walked 250 feet or more  -CW     Row Name 10/08/22 1020          Functional Assessment    Outcome Measure Options AM-PAC 6 Clicks Basic Mobility (PT)  -CW           User Key  (r) = Recorded By, (t) = Taken By, (c) = Cosigned By    Initials Name Provider Type    Bogdan Thomas PTA Physical Therapist Assistant                             Physical Therapy Education     Title: PT OT SLP Therapies (Done)     Topic: Physical Therapy (Done)     Point: Mobility training (Done)     Learning Progress Summary           Patient Acceptance, E,TB,D, VU,NR by  at 10/7/2022 1402                   Point: Home exercise program (Done)     Learning Progress Summary           Patient Acceptance, E,TB,D,  VU,NR by  at 10/7/2022 1409                   Point: Body mechanics (Done)     Learning Progress Summary           Patient Acceptance, E,TB,D, VU,NR by  at 10/7/2022 1409                   Point: Precautions (Done)     Learning Progress Summary           Patient Acceptance, E,TB,D, VU,NR by  at 10/7/2022 1409                               User Key     Initials Effective Dates Name Provider Type [facility] 04/08/22 -  Sandra Parry, PT Physical Therapist PT              PT Recommendation and Plan     Plan of Care Reviewed With: patient, spouse  Progress: improving  Outcome Evaluation: Pt increased with bed mobility, transfer, and amb safety and I with RWX to 250' and 1 step     Time Calculation:    PT Charges     Row Name 10/08/22 1021             Time Calculation    Start Time 1005  -CW      Stop Time 1021  -CW      Time Calculation (min) 16 min  -CW      PT Received On 10/08/22  -CW         Timed Charges    64068 - PT Therapeutic Activity Minutes 16  -CW         Total Minutes    Timed Charges Total Minutes 16  -CW       Total Minutes 16  -CW            User Key  (r) = Recorded By, (t) = Taken By, (c) = Cosigned By    Initials Name Provider Type    CW Bogdan Pena PTA Physical Therapist Assistant              Therapy Charges for Today     Code Description Service Date Service Provider Modifiers Qty    [id number] HC PT THERAPEUTIC ACT EA 15 MIN 10/8/2022 Bogdan Pena PTA GP 1          PT G-Codes  Outcome Measure Options: AM-PAC 6 Clicks Basic Mobility (PT)  AM-PAC 6 Clicks Score (PT): 24    Bogdan Pena PTA  10/8/2022

## 2022-10-08 NOTE — PLAN OF CARE
A/Ox4. No s/s distress. POD#1 L TKA. IV Cefazolin x 2 doses completed. PRN Percocet for pain. T/S ASA 81mg and Prasugrel today. WBAT. Assist x1 w/ walker, ambulated in the hallway this evening. Encouraged to do I.S.

## 2022-10-08 NOTE — PLAN OF CARE
Goal Outcome Evaluation:  Plan of Care Reviewed With: patient, spouse        Progress: improving  Outcome Evaluation: Pt increased with bed mobility, transfer, and amb safety and I with RWX to 250' and 1 step      Patient was wearing a face mask during this therapy encounter. Therapist used appropriate personal protective equipment including eye protection, mask, and gloves.  Mask used was standard procedure mask. Appropriate PPE was worn during the entire therapy session. Hand hygiene was completed before and after therapy session. Patient is not in enhanced droplet precautions.

## 2022-10-08 NOTE — DISCHARGE SUMMARY
Orthopedic Discharge Summary      Patient: Quentin Hart      YOB: 1955    Medical Record Number: [id number]    Attending Physician: Cholo Mckeon MD  Consulting Physician(s):   Date of Admission: 10/7/2022  5:03 AM  Date of Discharge: Patient is being discharged on Saturday, 8 October 2022.  Patient underwent total knee arthroplasty on the left side yesterday.  He has done extremely well postoperatively.  He is neurovascularly intact.  His calf is soft and nontender.  He is in good spirits.  He is awake alert oriented.  He is on Eliquis for DVT prophylaxis.  His pain is well controlled with Percocet.  He is being released to go home with his wife Alice in a stable satisfactory fashion      Patient Active Problem List   Diagnosis   • Status post total knee replacement, right   • Knee effusion, right   • Pain in both knees   • Effusion, left knee   • Primary osteoarthritis of left knee   • HTN (hypertension)   • DM (diabetes mellitus), type 2 (HCC)     CO TOTAL KNEE ARTHROPLASTY [89426] (TOTAL KNEE ARTHROPLASTY)       Allergies   Allergen Reactions   • Morphine Nausea And Vomiting   • Adenosine Other (See Comments)     Prolonged heart block with administration during FFR//Cath       Current Medications:     Discharge Medications      New Medications      Instructions Start Date   apixaban 2.5 MG tablet tablet  Commonly known as: ELIQUIS   2.5 mg, Oral, 2 Times Daily      oxyCODONE-acetaminophen 7.5-325 MG per tablet  Commonly known as: PERCOCET   1 tablet, Oral, Every 4 Hours PRN         Continue These Medications      Instructions Start Date   amLODIPine-benazepril 10-40 MG per capsule  Commonly known as: LOTREL   1 capsule, Oral, Daily      ASPIRIN 81 PO   81 mg, Oral, Daily, PT ADVISED TO CONTACT SURGEON REGARDING HOLDING PRIOR TO SURGERY      cholecalciferol 25 MCG (1000 UT) tablet  Commonly known as: VITAMIN D3   5,000 Units, Oral, Daily      famotidine 20 MG tablet  Commonly known as:  PEPCID   20 mg, Oral, Daily, PT UNSURE IF TAKING      fish oil 1000 MG capsule capsule   1,000 mg, Oral, Daily With Breakfast, PT HOLDING FOR SURGERY      glimepiride 1 MG tablet  Commonly known as: AMARYL   1 mg, Oral, Every Morning Before Breakfast      meclizine 25 MG tablet  Commonly known as: ANTIVERT   25 mg, Oral, 3 Times Daily PRN      metFORMIN 500 MG tablet  Commonly known as: GLUCOPHAGE   500 mg, Oral, 2 Times Daily With Meals      pantoprazole 40 MG EC tablet  Commonly known as: PROTONIX   40 mg, Oral, Every Morning      prasugrel 10 MG tablet  Commonly known as: EFFIENT   10 mg, Oral, Daily, PT HOLDING PRIOR TO SURGERY PER MD INSTRUCTIONS                  Past Medical History:   Diagnosis Date   • Coronary artery disease    • Diabetes mellitus (HCC)    • Dizziness    • GERD (gastroesophageal reflux disease)    • History of 2019 novel coronavirus disease (COVID-19) 01/2022    AND 8/2021   • History of brain concussion    • History of recent fall    • [place] (hard of hearing)    • Hypertension    • Knee pain    • Neck fracture (HCC)     X2   • Obstructive sleep apnea     NO LONGER USING CPAP DUE TO RECALL   • Osteoarthritis    • PONV (postoperative nausea and vomiting)    • Prediabetes    • SOB (shortness of breath) on exertion         Past Surgical History:   Procedure Laterality Date   • COLONOSCOPY     • CORONARY ANGIOPLASTY WITH STENT PLACEMENT     • THUMB ARTHROSCOPY     • TONSILLECTOMY     • TOTAL KNEE ARTHROPLASTY Right 1996    DR MALDONADO        Social History     Occupational History   • Not on file   Tobacco Use   • Smoking status: Former   • Smokeless tobacco: Never   Substance and Sexual Activity   • Alcohol use: Not Currently   • Drug use: Never   • Sexual activity: Defer      Social History     Social History Narrative   • Not on file        Family History   Problem Relation Age of Onset   • Malig Hyperthermia Neg Hx              Hospital Course:  67 y.o. male admitted to [facility] to  services of Cholo Mckeon MD with Primary osteoarthritis of left knee [M17.12]  Effusion, left knee [M25.462] on 10/7/2022 and underwent MA TOTAL KNEE ARTHROPLASTY [73201] (TOTAL KNEE ARTHROPLASTY) Per Cholo Mckeon MD. Antibiotic and VTE prophylaxis were per SCIP protocols. Post-operatively the patient transferred to the post-operative floor where the patient underwent mobilization therapy that included active as well as passive ROM exercises. Opioids were titrated to achieve appropriate pain management to allow for participation in mobilization exercises. Vital signs are now stable. The incision is intact without signs or symptoms of infection. Operative extremity neurovascular status remains intact.   Appropriate education re: incision care, activity levels, medications, and follow up visits was completed and all questions were answered. The patient is now deemed stable for discharge from hospital.      DIAGNOSTIC TESTS:   Admission on 10/07/2022   Component Date Value Ref Range Status   • Glucose 10/07/2022 117  70 - 130 mg/dL Final    Meter: [id number] : [id number] Hussein Arguello RN   • Glucose 10/07/2022 304 (H)  70 - 130 mg/dL Final    Meter: [id number] : [id number] Alvaro Yan   • Glucose 10/07/2022 287 (H)  70 - 130 mg/dL Final    Meter: [id number] : [id number] Radha BARRERA   • Glucose 10/08/2022 214 (H)  65 - 99 mg/dL Final   • BUN 10/08/2022 17  8 - 23 mg/dL Final   • Creatinine 10/08/2022 1.13  0.76 - 1.27 mg/dL Final   • Sodium 10/08/2022 133 (L)  136 - 145 mmol/L Final   • Potassium 10/08/2022 4.8  3.5 - 5.2 mmol/L Final   • Chloride 10/08/2022 99  98 - 107 mmol/L Final   • CO2 10/08/2022 24.4  22.0 - 29.0 mmol/L Final   • Calcium 10/08/2022 9.0  8.6 - 10.5 mg/dL Final   • BUN/Creatinine Ratio 10/08/2022 15.0  7.0 - 25.0 Final   • Anion Gap 10/08/2022 9.6  5.0 - 15.0 mmol/L Final   • eGFR 10/08/2022 70.4  >60.0 mL/min/1.73 Final    National Kidney Foundation and American  Society of Nephrology (ASN) Task Force recommended calculation based on the Chronic Kidney Disease Epidemiology Collaboration (CKD-EPI) equation refit without adjustment for race.   • Hemoglobin 10/08/2022 12.2 (L)  13.0 - 17.7 g/dL Final   • Hematocrit 10/08/2022 36.1 (L)  37.5 - 51.0 % Final   • Glucose 10/08/2022 245 (H)  70 - 130 mg/dL Final    Meter: [id number] : [id number] Radha BARRERA     No results found.    Discharge and Follow up Instructions:   Discharge Instructions:       1. Patient is to continue with physical therapy exercises BID and continue working with        the physical therapist as ordered.  2.  Continue to follow precautions as instructed.   3.  Patient may weight bear as tolerated.   4.  Continue DELORES hose daily and ice regularly. Patient instructed on frequent calf                  pumping exercises.  May remove compression stockings at night.  5.  Patient also instructed on incentive spirometer during hospitalization and                          encouraged to continue to use at home regularly.   6.  Patient is instructed to continue DVT prophylaxis.  7.  The dressing should be left in place. If waterproof dressing is intact the patient may         shower immediately following discharge. If the dressing becomes disloged or                   saturated it should be changed and patient must wait until POD #5 to shower. If               dressing is changed, apply dry sterile dressing after showering.  8.  Follow up appointment in 2 weeks - patient to call the office at [phone] to schedule.       Date: 10/8/2022    Cholo Mckeon MD      Time: Discharge 20 min     DICTATED UTILIZING DRAGON DICTATION

## 2022-10-08 NOTE — DOWNTIME EVENT NOTE
The EMR was down for 4 hours on 10/8/2022.    RN was responsible for completing the paper charting during this time period.     The following information was re-entered into the system by Laura Knox RN: Flowsheet data, Intake and output, Orders and MAR    The following information will remain in the paper chart: no paper chart.    Laura Knox RN  10/8/2022

## 2022-10-10 NOTE — CASE MANAGEMENT/SOCIAL WORK
Case Management Discharge Note      Final Note: DOUG MCGEE.         Selected Continued Care - Discharged on 10/8/2022 Admission date: 10/7/2022 - Discharge disposition: Home or Self Care    Destination    No services have been selected for the patient.              Durable Medical Equipment    No services have been selected for the patient.              Dialysis/Infusion    No services have been selected for the patient.              Home Medical Care Coordination complete.    Service Provider Selected Services Address Phone Fax Patient Preferred    [facility] [place] [address] [phone] --          Therapy    No services have been selected for the patient.              Community Resources    No services have been selected for the patient.              Community & DME    No services have been selected for the patient.                       Final Discharge Disposition Code: 06 - home with home health care   not since summer 2021

## 2022-10-11 ENCOUNTER — TELEPHONE (OUTPATIENT)
Dept: ORTHOPEDIC SURGERY | Facility: HOSPITAL | Age: 67
End: 2022-10-11

## 2022-10-11 NOTE — TELEPHONE ENCOUNTER
Post op day 3  Discharge Instructions:  Ask patient about his or her discharge instructions  ?  Patient confirmed understanding   ?  Further instruction needed   What, if any, recommendations, teaching, or interventions did you provide? Click or tap here to enter text.  Health status:  Pain controlled Yes   Does have some increased pain, the pain medication helps.   Recommended interventions:  Yes  incision/dressing status   ?  Clean without redness, drainage, odor  ?  Redness    ?  Drainage - color Click or tap here to enter text.  ?  Odor  BRYAN - Green light blinking Choose an item.  Difficulties urination No  Last BM 10/6/2022 (if no BM by day 3-recommend OTC suppository or fleets enema)  No BM yet, doesn’t feel like he needs to go.    Medications:  ?Medications reviewed with patient/family/caregiver  Patient taking medications as prescribed?   Yes  If not taking medications as prescribed, note specific medicine(s) and reason for each:  Click or tap here to enter text.  Hospital Follow Up Plan:  Follow up Appointment with Orthopedic surgeon:  ?Has f/u appointment                ?Scheduled f/u appointment  Home Care ordered at discharge?    Yes        Home Care started, or contact made?    Yes   If no, action taken:   DME obtained/used in home         Yes   Using IS  Choose an item.   Other information:   Mr. Hart was to be a same day and the MD decided to keep him overnight. He said he was doing great. PT was there at the time of this call. He is doing his exercises, walking, and is ready to go. He is using his ice and elevating his leg. Pain is controlled with the medications. He doesn’t have any questions for me at this time. Mr. Hart has my contact information should he need anything. He voiced understanding and appreciated the call.

## 2022-10-17 ENCOUNTER — TELEPHONE (OUTPATIENT)
Dept: ORTHOPEDIC SURGERY | Facility: HOSPITAL | Age: 67
End: 2022-10-17

## 2022-10-17 NOTE — TELEPHONE ENCOUNTER
Called and spoke with Mr. Hart to see how he is doing as he is 2 weeks SP LTK. He said he is doing really good. He is working with PT and progressing. Pain is controlled with the medications. He was requesting a refill on medication at this time. Told him to contact the MD office as I am not authorized to refill his medication but I would send a message to Dr. Mckeon as well. He voiced understanding. He said he is up and walking and doing great. BM's are good. He doesn't have any questions for me at this time. Mr. Hart has my contact information should he need anything. He voiced understanding and appreciated the call.

## 2022-10-18 ENCOUNTER — TELEPHONE (OUTPATIENT)
Dept: ORTHOPEDIC SURGERY | Facility: CLINIC | Age: 67
End: 2022-10-18

## 2022-10-18 DIAGNOSIS — Z96.651 STATUS POST TOTAL KNEE REPLACEMENT, RIGHT: ICD-10-CM

## 2022-10-18 RX ORDER — OXYCODONE AND ACETAMINOPHEN 7.5; 325 MG/1; MG/1
1 TABLET ORAL EVERY 4 HOURS PRN
Qty: 50 TABLET | Refills: 0 | Status: SHIPPED | OUTPATIENT
Start: 2022-10-18

## 2022-10-18 NOTE — TELEPHONE ENCOUNTER
Caller: MRS FRYE     Relationship: WIFE     Best call back number: [phone]    Requested Prescriptions:   oxyCODONE-acetaminophen (PERCOCET) 7.5-325 MG per tablet        Sig: Take 1 tablet by mouth Every 4 (Four) Hours As Needed for Moderate Pain.            Requested Prescriptions      No prescriptions requested or ordered in this encounter        Pharmacy where request should be sent: [facility]      Additional details provided by patient: PATIENT HAS ENOUGH FOR TOMORROW     Does the patient have less than a 3 day supply:  [x] Yes  [] No    Elana García Rep   10/18/22 15:33 EDT

## 2022-10-20 ENCOUNTER — OFFICE VISIT (OUTPATIENT)
Dept: ORTHOPEDIC SURGERY | Facility: CLINIC | Age: 67
End: 2022-10-20

## 2022-10-20 VITALS — WEIGHT: 246 LBS | HEIGHT: 72 IN | TEMPERATURE: 97.7 F | BODY MASS INDEX: 33.32 KG/M2

## 2022-10-20 DIAGNOSIS — Z96.652 S/P TKR (TOTAL KNEE REPLACEMENT), LEFT: Primary | ICD-10-CM

## 2022-10-20 PROCEDURE — 99024 POSTOP FOLLOW-UP VISIT: CPT | Performed by: ORTHOPAEDIC SURGERY

## 2022-10-20 NOTE — PROGRESS NOTES
POST OP TOTAL GLOBAL      NAME: Quentin Hart           : 1955    MRN: [id number]    Chief Complaint   Patient presents with   • Left Knee - Follow-up, Post-op     Total knee replacement 10/07/2022       Date of Surgery: 10/07/2022  ?  HPI:   Patient returns today for 13 day follow up of left total knee arthroplasty Incision(s) healing nicely/as expected with no signs of infection. Patient reports doing well with no unusual complaints. No fevers, rigors or chills. Appears to be progressing appropriately. Patient is on appropriate anticoagulation.       Ortho Exam:   Left knee.The patient is status post total knee arthroplasty postoperative 13 day(s). Incision is clean. Calf is soft and nontender. Homans sign is negative. There is no clicking, popping or catching. Anterior and posterior drawer signs are negative.  There is no instability of the components. Appropriate amounts of swelling and bruising are noted. Dorsalis pedis and posterior tibial artery pulses are palpable. Common peroneal nerve function is well preserved. Range of motion is from 10-85 degrees of flexion. Gait is cautious but otherwise fairly normal. There is no evidence of a deep seated joint infection.      Diagnostic Studies:  no diagnostic testing performed this visit        Assessment:  Diagnoses and all orders for this visit:    1. S/P TKR (total knee replacement), left (Primary)            Plan   · Incision care  · Remove staples in one week  · To use ACE wrap/DELORES stocking  · Continue ice to joint   · Stretching and strengthening exercises  · Aggressive ROM  · Falls precautions  · Once Eliquis is completed, change to an enteric coated Aspirin 325 mg daily until 6 weeks following your surgery  · Continue with lifelong antibiotic prophylaxis with dental procedures following total joint replacement.  · Follow up in 6 weeks     Date of encounter: 10/20/2022   Cholo Mckeon MD

## 2022-11-07 ENCOUNTER — TELEPHONE (OUTPATIENT)
Dept: ORTHOPEDIC SURGERY | Facility: CLINIC | Age: 67
End: 2022-11-07

## 2022-11-07 DIAGNOSIS — Z96.651 STATUS POST TOTAL KNEE REPLACEMENT, RIGHT: Primary | ICD-10-CM

## 2022-11-07 DIAGNOSIS — Z96.652 S/P TKR (TOTAL KNEE REPLACEMENT), LEFT: ICD-10-CM

## 2022-11-07 NOTE — TELEPHONE ENCOUNTER
PATIENT NEEDS THERAPY ORDER FOR OUTPATIENT  PT.     PATIENT HAS AN APPT ON Friday WITH  [facility] IN [place]. PLEASE FAX ORDER

## 2022-11-17 ENCOUNTER — TELEPHONE (OUTPATIENT)
Dept: ORTHOPEDIC SURGERY | Facility: CLINIC | Age: 67
End: 2022-11-17

## 2022-11-17 NOTE — TELEPHONE ENCOUNTER
Caller: Chilo Frye    Relationship: Emergency Contact    Best call back number: [phone]    What is the best time to reach you: ANYTIME    Who are you requesting to speak with (clinical staff, provider,  specific staff member): KENDAL PREFERABLY    Do you know the name of the person who called: CHILO FRYE    What was the call regarding: UNABLE TO WARM TRANSFER. PATIENT'S PHYSICAL THERAPIST TOOK OUT STAPLES AND PATIENT IS WORRIED ABOUT A SPOT THAT APPEARS AS IF IT IS OPENING UP A LITTLE BIT. PATIENT'S WIFE IS WANTING TO KNOW IF SHE COULD APPLY ANTIBIOTIC OINTMENT AND A BANDAID. PLEASE CALL PATIENT/PATIENT'S WIFE BACK AND ADVISE.    Do you require a callback: YES

## 2022-11-18 NOTE — TELEPHONE ENCOUNTER
Spoke to wife. Patient has been advised to keep clean and dry and covered.  No bandaids, no antibiotic ointment.

## 2022-11-21 ENCOUNTER — TELEPHONE (OUTPATIENT)
Dept: ORTHOPEDIC SURGERY | Facility: CLINIC | Age: 67
End: 2022-11-21

## 2022-11-21 RX ORDER — DOXYCYCLINE HYCLATE 100 MG/1
100 CAPSULE ORAL 2 TIMES DAILY
Qty: 14 CAPSULE | Refills: 0 | Status: SHIPPED | OUTPATIENT
Start: 2022-11-21

## 2022-11-21 NOTE — TELEPHONE ENCOUNTER
Spoke with pt. Wife on the phone, concerned about pt. Rash on left knee where knee incision is. Pt. Wife asked if they could come in for office to look at.     Looked at Pt. Knee Red raised rash covering top of knee incision fading down the incision and leg. Pt. Complained of itching and oozing. I Communicated with   Dr. Mckeon, he ordered Doxycycline 100mg po bid for 7 days sent to [facility].   Cleaned incision with antibacterial soap. Patted dry, placed border bandage also instructed pt. To do the same at home adding NO CREAMS OR OINTMENTS on incision.  Pt. Stated he understood.HB

## 2022-11-23 ENCOUNTER — TELEPHONE (OUTPATIENT)
Dept: ORTHOPEDIC SURGERY | Facility: CLINIC | Age: 67
End: 2022-11-23

## 2022-11-23 NOTE — TELEPHONE ENCOUNTER
Patient called and stated he has concerns about his incision.  He was advised to come in today.  He was seen and there is a rash about his incision.  I believe this is from the adhesive tape from the bordered gauze.  We cleaned the incision.  We are advising to keep all adhesive off the skin.  His incision is well healed.  He has also been advised to apply hydrocortisone cream or benadryl cream on the affected area, but not to put it on the incision.  Patient will call with a report in a couple of days.

## 2022-12-01 ENCOUNTER — OFFICE VISIT (OUTPATIENT)
Dept: ORTHOPEDIC SURGERY | Facility: CLINIC | Age: 67
End: 2022-12-01

## 2022-12-01 VITALS — BODY MASS INDEX: 33.32 KG/M2 | WEIGHT: 246 LBS | HEIGHT: 72 IN | TEMPERATURE: 97.3 F

## 2022-12-01 DIAGNOSIS — Z96.652 S/P TKR (TOTAL KNEE REPLACEMENT), LEFT: Primary | ICD-10-CM

## 2022-12-01 PROCEDURE — 99024 POSTOP FOLLOW-UP VISIT: CPT | Performed by: ORTHOPAEDIC SURGERY

## 2022-12-01 NOTE — PROGRESS NOTES
POST OP TOTAL GLOBAL      NAME: Quentin Hart           : 1955    MRN: [id number]    Chief Complaint   Patient presents with   • Left Knee - Post-op     Total knee replacement 10/07/2022       Date of Surgery: 10/07/2022  ?  HPI:   Patient returns today for 8 week follow up of left total knee arthroplasty Incision(s) healing nicely/as expected with no signs of infection. Patient reports doing well with no unusual complaints. No fevers, rigors or chills. Appears to be progressing appropriately. Patient is on appropriate anticoagulation.  He is doing very well postoperatively.  Range of motion has progressively improved with physical therapy.      Ortho Exam:   Left knee.The patient is status post total knee arthroplasty postoperative 8 week(s). Incision is clean. Calf is soft and nontender. Homans sign is negative. There is no clicking, popping or catching. Anterior and posterior drawer signs are negative.  There is no instability of the components. Appropriate amounts of swelling and bruising are noted. Dorsalis pedis and posterior tibial artery pulses are palpable. Common peroneal nerve function is well preserved. Range of motion is from 0-110 degrees of flexion. Gait is cautious but otherwise fairly normal. There is no evidence of a deep seated joint infection.      Diagnostic Studies:  no diagnostic testing performed this visit        Assessment:  Diagnoses and all orders for this visit:    1. S/P TKR (total knee replacement), left (Primary)            Plan   · Incision care  · To use ACE wrap/DELORES stocking  · Continue ice to joint   · Stretching and strengthening exercises  · Aggressive ROM  · Falls precautions  · You may now resume any prescription medications you were taking prior to surgery  · Continue with lifelong antibiotic prophylaxis with dental procedures following total joint replacement.  · Follow up as needed    Date of encounter: 2022   Cholo Mckeon MD

## 2022-12-18 PROBLEM — Z96.652 S/P TKR (TOTAL KNEE REPLACEMENT), LEFT: Status: ACTIVE | Noted: 2022-12-18

## 2022-12-30 DIAGNOSIS — Z96.652 S/P TKR (TOTAL KNEE REPLACEMENT), LEFT: Primary | ICD-10-CM

## 2023-01-19 ENCOUNTER — OFFICE VISIT (OUTPATIENT)
Dept: ORTHOPEDIC SURGERY | Facility: CLINIC | Age: 68
End: 2023-01-19
Payer: MEDICARE

## 2023-01-19 ENCOUNTER — HOSPITAL ENCOUNTER (OUTPATIENT)
Dept: GENERAL RADIOLOGY | Facility: HOSPITAL | Age: 68
Discharge: HOME | End: 2023-01-19
Admitting: ORTHOPAEDIC SURGERY
Payer: MEDICARE

## 2023-01-19 VITALS — HEIGHT: 72 IN | TEMPERATURE: 97.8 F | WEIGHT: 235 LBS | BODY MASS INDEX: 31.83 KG/M2

## 2023-01-19 DIAGNOSIS — Z96.651 STATUS POST TOTAL KNEE REPLACEMENT, RIGHT: ICD-10-CM

## 2023-01-19 DIAGNOSIS — Z96.651 STATUS POST TOTAL KNEE REPLACEMENT, RIGHT: Primary | ICD-10-CM

## 2023-01-19 DIAGNOSIS — Z96.652 S/P TKR (TOTAL KNEE REPLACEMENT), LEFT: ICD-10-CM

## 2023-01-19 PROCEDURE — 73560 X-RAY EXAM OF KNEE 1 OR 2: CPT

## 2023-01-19 PROCEDURE — 99213 OFFICE O/P EST LOW 20 MIN: CPT | Performed by: ORTHOPAEDIC SURGERY

## 2023-08-08 ENCOUNTER — TELEPHONE (OUTPATIENT)
Dept: ORTHOPEDIC SURGERY | Facility: CLINIC | Age: 68
End: 2023-08-08
Payer: MEDICARE

## 2023-08-08 NOTE — TELEPHONE ENCOUNTER
PATIENT'S WIFE CALLED WANTING TO SPEAK WITH KENDAL REGARDING PATIENT'S KNEE.      SHE STATED THAT PATIENT IS HAVING SHOOTING PAIN AND SHE BELIEVES IT IS FROM HIS SCAR.  SHE STATED THAT THE SCAR IS HUMPED UP REAL THICK.    PATIENT IS S/P LEFT TOTAL KNEE REPLACEMENT ON 10/07/22

## 2023-08-09 NOTE — TELEPHONE ENCOUNTER
Spoke to the patient's wife.  Instructed deep tissue scar massage and vitamin e oil.  Patient will try this and let us know if he is still having issues.

## (undated) DEVICE — PICO 7 10CM X 30CM: Brand: PICO™ 7

## (undated) DEVICE — GLV SURG SENSICARE PI LF PF 8 GRN STRL

## (undated) DEVICE — ANTIBACTERIAL UNDYED BRAIDED (POLYGLACTIN 910), SYNTHETIC ABSORBABLE SUTURE: Brand: COATED VICRYL

## (undated) DEVICE — STERILE PATIENT PROTECTIVE PAD FOR IMP® KNEE POSITIONERS & COHESIVE WRAP (10 / CASE): Brand: DE MAYO KNEE POSITIONER®

## (undated) DEVICE — MAT FLR ABSORBENT LG 4FT 10 2.5FT

## (undated) DEVICE — BNDG ELAS ELITE V/CLOSE 6IN 5YD LF STRL

## (undated) DEVICE — DUAL CUT SAGITTAL BLADE

## (undated) DEVICE — APPL CHLORAPREP HI/LITE 26ML ORNG

## (undated) DEVICE — PROXIMATE RH ROTATING HEAD SKIN STAPLERS (35 REGULAR) CONTAINS 35 STAINLESS STEEL STAPLES: Brand: PROXIMATE

## (undated) DEVICE — STPLR SKIN VISISTAT WD 35CT

## (undated) DEVICE — BNDG ELAS CO-FLEX SLF ADHR 6IN 5YD LF STRL

## (undated) DEVICE — UNDERCAST PADDING: Brand: DEROYAL

## (undated) DEVICE — PK KN TOTL 40

## (undated) DEVICE — PREP SOL POVIDONE/IODINE BT 4OZ

## (undated) DEVICE — INSTRUMENT BATTERY

## (undated) DEVICE — INTEGUSEAL MICROBIAL SEALANT: Brand: AVANOS

## (undated) DEVICE — TRAP FLD MINIVAC MEGADYNE 100ML

## (undated) DEVICE — DRAPE,C-SECTION,CLR SCREEN,WIRE: Brand: MEDLINE

## (undated) DEVICE — GLV SURG PREMIERPRO ORTHO LTX PF SZ8 BRN

## (undated) DEVICE — TBG PENCL TELESCP MEGADYNE SMOKE EVAC 10FT